# Patient Record
Sex: FEMALE | Race: OTHER | Employment: FULL TIME | URBAN - NONMETROPOLITAN AREA
[De-identification: names, ages, dates, MRNs, and addresses within clinical notes are randomized per-mention and may not be internally consistent; named-entity substitution may affect disease eponyms.]

---

## 2017-07-03 ENCOUNTER — TELEPHONE (OUTPATIENT)
Dept: FAMILY MEDICINE CLINIC | Age: 22
End: 2017-07-03

## 2017-07-10 ENCOUNTER — OFFICE VISIT (OUTPATIENT)
Dept: FAMILY MEDICINE CLINIC | Age: 22
End: 2017-07-10

## 2017-07-10 ENCOUNTER — TELEPHONE (OUTPATIENT)
Dept: FAMILY MEDICINE CLINIC | Age: 22
End: 2017-07-10

## 2017-07-10 VITALS
HEIGHT: 62 IN | SYSTOLIC BLOOD PRESSURE: 118 MMHG | WEIGHT: 164.4 LBS | DIASTOLIC BLOOD PRESSURE: 74 MMHG | BODY MASS INDEX: 30.25 KG/M2 | TEMPERATURE: 98.2 F | RESPIRATION RATE: 12 BRPM | HEART RATE: 80 BPM

## 2017-07-10 DIAGNOSIS — R07.9 CHEST PAIN, UNSPECIFIED TYPE: Primary | ICD-10-CM

## 2017-07-10 DIAGNOSIS — N92.6 IRREGULAR PERIODS: ICD-10-CM

## 2017-07-10 DIAGNOSIS — F41.9 ANXIETY: ICD-10-CM

## 2017-07-10 LAB
CONTROL: NORMAL
PREGNANCY TEST URINE, POC: NORMAL

## 2017-07-10 PROCEDURE — 99203 OFFICE O/P NEW LOW 30 MIN: CPT | Performed by: NURSE PRACTITIONER

## 2017-07-10 PROCEDURE — 81025 URINE PREGNANCY TEST: CPT | Performed by: NURSE PRACTITIONER

## 2017-07-10 PROCEDURE — 93000 ELECTROCARDIOGRAM COMPLETE: CPT | Performed by: NURSE PRACTITIONER

## 2017-07-10 RX ORDER — CLONAZEPAM 0.5 MG/1
0.5 TABLET ORAL 2 TIMES DAILY PRN
COMMUNITY
End: 2018-03-26 | Stop reason: ALTCHOICE

## 2017-07-10 RX ORDER — ESCITALOPRAM OXALATE 10 MG/1
10 TABLET ORAL DAILY
Qty: 30 TABLET | Refills: 3 | Status: SHIPPED | OUTPATIENT
Start: 2017-07-10 | End: 2018-03-13 | Stop reason: SINTOL

## 2017-07-14 ENCOUNTER — TELEPHONE (OUTPATIENT)
Dept: FAMILY MEDICINE CLINIC | Age: 22
End: 2017-07-14

## 2017-07-17 PROCEDURE — 87389 HIV-1 AG W/HIV-1&-2 AB AG IA: CPT

## 2017-07-17 PROCEDURE — 36415 COLL VENOUS BLD VENIPUNCTURE: CPT

## 2017-07-17 PROCEDURE — 86706 HEP B SURFACE ANTIBODY: CPT

## 2017-07-17 PROCEDURE — 86803 HEPATITIS C AB TEST: CPT

## 2017-08-16 ENCOUNTER — TELEPHONE (OUTPATIENT)
Dept: FAMILY MEDICINE CLINIC | Age: 22
End: 2017-08-16

## 2017-08-28 ENCOUNTER — TELEPHONE (OUTPATIENT)
Dept: FAMILY MEDICINE CLINIC | Age: 22
End: 2017-08-28

## 2017-09-15 ENCOUNTER — TELEPHONE (OUTPATIENT)
Dept: FAMILY MEDICINE CLINIC | Age: 22
End: 2017-09-15

## 2018-03-13 ENCOUNTER — TELEPHONE (OUTPATIENT)
Dept: FAMILY MEDICINE CLINIC | Age: 23
End: 2018-03-13

## 2018-03-13 ENCOUNTER — HOSPITAL ENCOUNTER (EMERGENCY)
Age: 23
Discharge: HOME | End: 2018-03-13
Payer: COMMERCIAL

## 2018-03-13 VITALS
HEIGHT: 62 IN | SYSTOLIC BLOOD PRESSURE: 121 MMHG | RESPIRATION RATE: 16 BRPM | HEART RATE: 91 BPM | BODY MASS INDEX: 31.1 KG/M2 | TEMPERATURE: 99.6 F | OXYGEN SATURATION: 98 % | DIASTOLIC BLOOD PRESSURE: 72 MMHG | WEIGHT: 169 LBS

## 2018-03-13 DIAGNOSIS — F41.1 ANXIETY STATE: ICD-10-CM

## 2018-03-13 DIAGNOSIS — R07.89 RIGHT-SIDED CHEST WALL PAIN: ICD-10-CM

## 2018-03-13 DIAGNOSIS — N30.01 ACUTE CYSTITIS WITH HEMATURIA: Primary | ICD-10-CM

## 2018-03-13 LAB
BILIRUBIN URINE: NEGATIVE
BLOOD, URINE: ABNORMAL
CHARACTER, URINE: CLEAR
COLOR: ABNORMAL
EKG ATRIAL RATE: 83 BPM
EKG P AXIS: 48 DEGREES
EKG P-R INTERVAL: 148 MS
EKG Q-T INTERVAL: 358 MS
EKG QRS DURATION: 84 MS
EKG QTC CALCULATION (BAZETT): 420 MS
EKG R AXIS: 61 DEGREES
EKG T AXIS: 73 DEGREES
EKG VENTRICULAR RATE: 83 BPM
GLUCOSE BLD-MCNC: 85 MG/DL (ref 70–108)
GLUCOSE, URINE: 100 MG/DL
KETONES, URINE: NEGATIVE
LEUKOCYTES, UA: ABNORMAL
NITRATE, UA: POSITIVE
PH UA: 7.5 (ref 5–9)
PROTEIN UA: 30 MG/DL
REFLEX TO URINE C & S: ABNORMAL
SPECIFIC GRAVITY UA: 1.01 (ref 1–1.03)
UROBILINOGEN, URINE: 1 EU/DL (ref 0–1)

## 2018-03-13 PROCEDURE — 81003 URINALYSIS AUTO W/O SCOPE: CPT

## 2018-03-13 PROCEDURE — 99215 OFFICE O/P EST HI 40 MIN: CPT

## 2018-03-13 PROCEDURE — 87086 URINE CULTURE/COLONY COUNT: CPT

## 2018-03-13 PROCEDURE — 99213 OFFICE O/P EST LOW 20 MIN: CPT | Performed by: NURSE PRACTITIONER

## 2018-03-13 PROCEDURE — 82948 REAGENT STRIP/BLOOD GLUCOSE: CPT

## 2018-03-13 PROCEDURE — 93005 ELECTROCARDIOGRAM TRACING: CPT | Performed by: EMERGENCY MEDICINE

## 2018-03-13 RX ORDER — METHYLPREDNISOLONE 4 MG/1
TABLET ORAL
Qty: 1 KIT | Refills: 0 | Status: SHIPPED | OUTPATIENT
Start: 2018-03-13 | End: 2018-03-19

## 2018-03-13 RX ORDER — LORATADINE 10 MG/1
10 TABLET ORAL DAILY
COMMUNITY
End: 2018-12-30

## 2018-03-13 RX ORDER — M-VIT,TX,IRON,MINS/CALC/FOLIC 27MG-0.4MG
1 TABLET ORAL DAILY
COMMUNITY

## 2018-03-13 RX ORDER — NITROFURANTOIN 25; 75 MG/1; MG/1
100 CAPSULE ORAL 2 TIMES DAILY
Qty: 10 CAPSULE | Refills: 0 | Status: SHIPPED | OUTPATIENT
Start: 2018-03-13 | End: 2018-03-18

## 2018-03-13 RX ORDER — HYDROXYZINE HYDROCHLORIDE 25 MG/1
25 TABLET, FILM COATED ORAL 3 TIMES DAILY PRN
Qty: 15 TABLET | Refills: 0 | Status: SHIPPED | OUTPATIENT
Start: 2018-03-13 | End: 2018-03-18

## 2018-03-13 RX ORDER — NICOTINE 14MG/24HR
1 PATCH, TRANSDERMAL 24 HOURS TRANSDERMAL DAILY
Qty: 14 CAPSULE | Refills: 0 | Status: SHIPPED | OUTPATIENT
Start: 2018-03-13 | End: 2018-06-13

## 2018-03-13 ASSESSMENT — ENCOUNTER SYMPTOMS
CHOKING: 0
SORE THROAT: 0
ABDOMINAL DISTENTION: 0
HEMATOCHEZIA: 0
VISUAL CHANGE: 0
CONSTIPATION: 0
NAUSEA: 1
BACK PAIN: 0
RECTAL PAIN: 0
ANAL BLEEDING: 0
COUGH: 0
BLOOD IN STOOL: 0
STRIDOR: 0
DIARRHEA: 0
SINUS PAIN: 0
SINUS PRESSURE: 0
WHEEZING: 0
VOMITING: 0
SHORTNESS OF BREATH: 0
ABDOMINAL PAIN: 1
APNEA: 0
CHEST TIGHTNESS: 0

## 2018-03-13 ASSESSMENT — PAIN DESCRIPTION - DESCRIPTORS: DESCRIPTORS_2: SHARP

## 2018-03-13 ASSESSMENT — PAIN DESCRIPTION - LOCATION: LOCATION_2: CHEST

## 2018-03-13 ASSESSMENT — PAIN DESCRIPTION - ORIENTATION: ORIENTATION_2: LEFT;MID

## 2018-03-13 ASSESSMENT — PAIN SCALES - GENERAL: PAINLEVEL_OUTOF10: 9

## 2018-03-13 ASSESSMENT — PAIN DESCRIPTION - INTENSITY: RATING_2: 3

## 2018-03-13 NOTE — ED PROVIDER NOTES
back or flank pain or increase in hematuria the patient is advised to go to the emergency department for reevaluation and further follow-up if they wouldn't notice any of the above symptoms. The patient or Patient designated representative was also advised to follow up with family doctor or primary care provider after the antibiotic is completed for repeat urinalysis. The patient did verbalize understanding of discharge instructions and is agreeable to the treatment plan. The patient left ambulatory without any changes or concerns in stable condition.       PATIENT REFERRED TO:  Jessica Lacey, [address] [place] [address] [address] [facility]  [phone]    Schedule an appointment as soon as possible for a visit in 1 week  For wound re-check    Kinjal Adams, PhD  [address] [place] [address] [phone]    Call   As needed    DISCHARGE MEDICATIONS:  Discharge Medication List as of 3/13/2018  5:31 PM      START taking these medications    Details   nitrofurantoin, macrocrystal-monohydrate, (MACROBID) 100 MG capsule Take 1 capsule by mouth 2 times daily for 5 days, Disp-10 capsule, R-0Normal      Saccharomyces boulardii (PROBIOTIC) 250 MG CAPS Take 1 capsule by mouth daily, Disp-14 capsule, R-0Normal      methylPREDNISolone (MEDROL, MANDY,) 4 MG tablet Take by mouth., Disp-1 kit, R-0Normal      Cyanocobalamin 1000 MCG LOZG Take 1 lozenge by mouth daily, Disp-30 lozenge, R-0Normal      hydrOXYzine (ATARAX) 25 MG tablet Take 1 tablet by mouth 3 times daily as needed for Anxiety, Disp-15 tablet, R-0Normal           Discharge Medication List as of 3/13/2018  5:31 PM          Zunilda Davis, LEONARDA Davis NP  03/13/18 1739

## 2018-03-14 LAB
ORGANISM: ABNORMAL
URINE CULTURE REFLEX: ABNORMAL

## 2018-03-26 ENCOUNTER — HOSPITAL ENCOUNTER (EMERGENCY)
Age: 23
Discharge: HOME | End: 2018-03-26
Payer: COMMERCIAL

## 2018-03-26 ENCOUNTER — TELEPHONE (OUTPATIENT)
Dept: FAMILY MEDICINE CLINIC | Age: 23
End: 2018-03-26

## 2018-03-26 VITALS
OXYGEN SATURATION: 98 % | TEMPERATURE: 98 F | HEART RATE: 85 BPM | SYSTOLIC BLOOD PRESSURE: 110 MMHG | DIASTOLIC BLOOD PRESSURE: 73 MMHG | RESPIRATION RATE: 16 BRPM

## 2018-03-26 DIAGNOSIS — R53.83 FATIGUE, UNSPECIFIED TYPE: ICD-10-CM

## 2018-03-26 DIAGNOSIS — N39.0 RECURRENT UTI: Primary | ICD-10-CM

## 2018-03-26 LAB
BILIRUBIN URINE: NEGATIVE
BLOOD, URINE: ABNORMAL
CHARACTER, URINE: CLEAR
COLOR: YELLOW
GLUCOSE, URINE: NEGATIVE MG/DL
KETONES, URINE: NEGATIVE
LEUKOCYTES, UA: ABNORMAL
MONONUCLEOSIS ANTIBODY: NEGATIVE
NITRATE, UA: NEGATIVE
PH UA: 6 (ref 5–9)
PROTEIN UA: ABNORMAL MG/DL
REFLEX TO URINE C & S: ABNORMAL
SPECIFIC GRAVITY UA: >= 1.03 (ref 1–1.03)
UROBILINOGEN, URINE: 0.2 EU/DL (ref 0–1)

## 2018-03-26 PROCEDURE — 81003 URINALYSIS AUTO W/O SCOPE: CPT

## 2018-03-26 PROCEDURE — 99214 OFFICE O/P EST MOD 30 MIN: CPT

## 2018-03-26 PROCEDURE — 36415 COLL VENOUS BLD VENIPUNCTURE: CPT

## 2018-03-26 PROCEDURE — 99214 OFFICE O/P EST MOD 30 MIN: CPT | Performed by: NURSE PRACTITIONER

## 2018-03-26 PROCEDURE — 87086 URINE CULTURE/COLONY COUNT: CPT

## 2018-03-26 PROCEDURE — 86308 HETEROPHILE ANTIBODY SCREEN: CPT

## 2018-03-26 RX ORDER — PHENAZOPYRIDINE HYDROCHLORIDE 100 MG/1
100 TABLET, FILM COATED ORAL 3 TIMES DAILY PRN
Qty: 9 TABLET | Refills: 0 | Status: SHIPPED | OUTPATIENT
Start: 2018-03-26 | End: 2018-03-29

## 2018-03-26 RX ORDER — LEVOFLOXACIN 500 MG/1
500 TABLET, FILM COATED ORAL DAILY
Qty: 7 TABLET | Refills: 0 | Status: SHIPPED | OUTPATIENT
Start: 2018-03-26 | End: 2018-04-02

## 2018-03-26 ASSESSMENT — PAIN DESCRIPTION - FREQUENCY: FREQUENCY: INTERMITTENT

## 2018-03-26 ASSESSMENT — PAIN DESCRIPTION - PAIN TYPE: TYPE: ACUTE PAIN

## 2018-03-26 ASSESSMENT — ENCOUNTER SYMPTOMS
ABDOMINAL DISTENTION: 0
NAUSEA: 0
SORE THROAT: 1
BACK PAIN: 1
COLOR CHANGE: 0
COUGH: 1
BLOOD IN STOOL: 0
CHEST TIGHTNESS: 0
RHINORRHEA: 0
WHEEZING: 0
VOMITING: 0
SHORTNESS OF BREATH: 0
DIARRHEA: 0
TROUBLE SWALLOWING: 0
ABDOMINAL PAIN: 1

## 2018-03-26 ASSESSMENT — PAIN DESCRIPTION - LOCATION: LOCATION: ABDOMEN

## 2018-03-26 ASSESSMENT — PAIN DESCRIPTION - ORIENTATION: ORIENTATION: LOWER

## 2018-03-26 ASSESSMENT — PAIN SCALES - GENERAL: PAINLEVEL_OUTOF10: 8

## 2018-03-26 ASSESSMENT — PAIN DESCRIPTION - DESCRIPTORS: DESCRIPTORS: BURNING;PRESSURE

## 2018-03-26 NOTE — ED TRIAGE NOTES
Patient walked to room 5 for lower abdominal pressure, pain with urination and low back pain onset 3 months ago, symptoms never went away after antibiotics multiple times but keep coming back. No other needs.

## 2018-03-26 NOTE — ED PROVIDER NOTES
external ear and ear canal normal.   Left Ear: Hearing, tympanic membrane, external ear and ear canal normal.   Nose: Nose normal. No mucosal edema or rhinorrhea. Right sinus exhibits no maxillary sinus tenderness and no frontal sinus tenderness. Left sinus exhibits no maxillary sinus tenderness and no frontal sinus tenderness. Mouth/Throat: Uvula is midline, oropharynx is clear and moist and mucous membranes are normal. No trismus in the jaw. No uvula swelling. No oropharyngeal exudate, posterior oropharyngeal edema, posterior oropharyngeal erythema or tonsillar abscesses. Eyes: Right conjunctiva is not injected. Right conjunctiva has no hemorrhage. Left conjunctiva is not injected. Left conjunctiva has no hemorrhage. No scleral icterus. Neck: Trachea normal and normal range of motion. Neck supple. No thyromegaly present. Cardiovascular: Normal rate, regular rhythm and normal heart sounds. No extrasystoles are present. PMI is not displaced. Exam reveals no gallop and no friction rub. No murmur heard. Pulmonary/Chest: Effort normal and breath sounds normal. No respiratory distress. Abdominal: Soft. Normal appearance and bowel sounds are normal. She exhibits no distension and no mass. There is no hepatosplenomegaly. There is tenderness in the suprapubic area. There is no rigidity, no rebound, no guarding, no CVA tenderness, no tenderness at McBurney's point and negative Meredith's sign. No hernia. Lymphadenopathy:        Head (right side): No submental, no submandibular, no tonsillar, no preauricular, no posterior auricular and no occipital adenopathy present. Head (left side): No submental, no submandibular, no tonsillar, no preauricular, no posterior auricular and no occipital adenopathy present. She has no cervical adenopathy. Right: No supraclavicular adenopathy present. Left: No supraclavicular adenopathy present.    Neurological: She is alert and oriented to person,

## 2018-03-27 LAB
ORGANISM: ABNORMAL
URINE CULTURE REFLEX: ABNORMAL

## 2018-05-22 ENCOUNTER — HOSPITAL ENCOUNTER (EMERGENCY)
Age: 23
Discharge: HOME | End: 2018-05-22
Payer: COMMERCIAL

## 2018-05-22 VITALS
TEMPERATURE: 98.7 F | RESPIRATION RATE: 14 BRPM | BODY MASS INDEX: 28.78 KG/M2 | HEART RATE: 100 BPM | WEIGHT: 156.38 LBS | DIASTOLIC BLOOD PRESSURE: 76 MMHG | HEIGHT: 62 IN | SYSTOLIC BLOOD PRESSURE: 134 MMHG | OXYGEN SATURATION: 99 %

## 2018-05-22 DIAGNOSIS — N39.0 RECURRENT UTI: ICD-10-CM

## 2018-05-22 DIAGNOSIS — N39.0 URINARY TRACT INFECTION WITH HEMATURIA, SITE UNSPECIFIED: Primary | ICD-10-CM

## 2018-05-22 DIAGNOSIS — R31.9 URINARY TRACT INFECTION WITH HEMATURIA, SITE UNSPECIFIED: Primary | ICD-10-CM

## 2018-05-22 LAB
BILIRUBIN URINE: NEGATIVE
BLOOD, URINE: ABNORMAL
CHARACTER, URINE: ABNORMAL
COLOR: ABNORMAL
GLUCOSE, URINE: NEGATIVE MG/DL
KETONES, URINE: NEGATIVE
LEUKOCYTES, UA: ABNORMAL
NITRATE, UA: NEGATIVE
PH UA: 8.5 (ref 5–9)
PROTEIN UA: 30 MG/DL
REFLEX TO URINE C & S: ABNORMAL
SPECIFIC GRAVITY UA: 1.01 (ref 1–1.03)
UROBILINOGEN, URINE: 0.2 EU/DL (ref 0–1)

## 2018-05-22 PROCEDURE — 87077 CULTURE AEROBIC IDENTIFY: CPT

## 2018-05-22 PROCEDURE — 87086 URINE CULTURE/COLONY COUNT: CPT

## 2018-05-22 PROCEDURE — 81003 URINALYSIS AUTO W/O SCOPE: CPT

## 2018-05-22 PROCEDURE — 87184 SC STD DISK METHOD PER PLATE: CPT

## 2018-05-22 PROCEDURE — 99214 OFFICE O/P EST MOD 30 MIN: CPT

## 2018-05-22 PROCEDURE — 99213 OFFICE O/P EST LOW 20 MIN: CPT | Performed by: NURSE PRACTITIONER

## 2018-05-22 PROCEDURE — 87186 SC STD MICRODIL/AGAR DIL: CPT

## 2018-05-22 RX ORDER — CIPROFLOXACIN 250 MG/1
250 TABLET, FILM COATED ORAL 2 TIMES DAILY
Qty: 6 TABLET | Refills: 0 | Status: SHIPPED | OUTPATIENT
Start: 2018-05-22 | End: 2018-05-25

## 2018-05-22 RX ORDER — GRANULES FOR ORAL 3 G/1
3 POWDER ORAL ONCE
Qty: 1 EACH | Refills: 0 | Status: SHIPPED | OUTPATIENT
Start: 2018-05-22 | End: 2018-05-22

## 2018-05-22 ASSESSMENT — ENCOUNTER SYMPTOMS
NAUSEA: 0
VOMITING: 0
CHEST TIGHTNESS: 0
ABDOMINAL PAIN: 0
DIARRHEA: 0
SHORTNESS OF BREATH: 0

## 2018-05-24 LAB
ORGANISM: ABNORMAL
URINE CULTURE REFLEX: ABNORMAL

## 2018-06-13 ENCOUNTER — TELEPHONE (OUTPATIENT)
Dept: UROLOGY | Age: 23
End: 2018-06-13

## 2018-06-13 ENCOUNTER — OFFICE VISIT (OUTPATIENT)
Dept: UROLOGY | Age: 23
End: 2018-06-13
Payer: COMMERCIAL

## 2018-06-13 VITALS
HEIGHT: 62 IN | WEIGHT: 156 LBS | DIASTOLIC BLOOD PRESSURE: 68 MMHG | BODY MASS INDEX: 28.71 KG/M2 | SYSTOLIC BLOOD PRESSURE: 118 MMHG

## 2018-06-13 DIAGNOSIS — Z87.440 HISTORY OF RECURRENT UTIS: Primary | ICD-10-CM

## 2018-06-13 LAB
BACTERIA: ABNORMAL
BILIRUBIN URINE: NEGATIVE
BILIRUBIN URINE: NEGATIVE
BLOOD URINE, POC: NORMAL
BLOOD, URINE: NEGATIVE
CASTS: ABNORMAL /LPF
CASTS: ABNORMAL /LPF
CHARACTER, URINE: ABNORMAL
CHARACTER, URINE: CLEAR
COLOR, URINE: YELLOW
COLOR: YELLOW
CRYSTALS: ABNORMAL
EPITHELIAL CELLS, UA: ABNORMAL /HPF
GLUCOSE URINE: NEGATIVE MG/DL
GLUCOSE, URINE: NEGATIVE MG/DL
KETONES, URINE: NEGATIVE
KETONES, URINE: NEGATIVE
LEUKOCYTE CLUMPS, URINE: NEGATIVE
LEUKOCYTE ESTERASE, URINE: NEGATIVE
MISCELLANEOUS LAB TEST RESULT: ABNORMAL
MUCUS: ABNORMAL
NITRITE, URINE: NEGATIVE
NITRITE, URINE: NEGATIVE
PH UA: 6
PH, URINE: 6
POST VOID RESIDUAL (PVR): 41 ML
PROTEIN UA: NEGATIVE MG/DL
PROTEIN, URINE: NEGATIVE MG/DL
RBC URINE: ABNORMAL /HPF
RENAL EPITHELIAL, UA: ABNORMAL
SPECIFIC GRAVITY UA: 1.03 (ref 1–1.03)
SPECIFIC GRAVITY, URINE: >= 1.03 (ref 1–1.03)
UROBILINOGEN, URINE: 0.2 EU/DL
UROBILINOGEN, URINE: 0.2 EU/DL
WBC UA: ABNORMAL /HPF
YEAST: ABNORMAL

## 2018-06-13 PROCEDURE — 99203 OFFICE O/P NEW LOW 30 MIN: CPT | Performed by: NURSE PRACTITIONER

## 2018-06-13 PROCEDURE — 81003 URINALYSIS AUTO W/O SCOPE: CPT | Performed by: NURSE PRACTITIONER

## 2018-06-13 PROCEDURE — 51798 US URINE CAPACITY MEASURE: CPT | Performed by: NURSE PRACTITIONER

## 2018-06-13 RX ORDER — NITROFURANTOIN 25; 75 MG/1; MG/1
CAPSULE ORAL
Qty: 30 CAPSULE | Refills: 1 | Status: SHIPPED | OUTPATIENT
Start: 2018-06-13 | End: 2018-12-30

## 2018-06-13 ASSESSMENT — ENCOUNTER SYMPTOMS
NAUSEA: 0
ABDOMINAL PAIN: 0
ALLERGIC/IMMUNOLOGIC NEGATIVE: 1
RESPIRATORY NEGATIVE: 1
EYES NEGATIVE: 1
VOMITING: 0
BACK PAIN: 0
ABDOMINAL DISTENTION: 0

## 2018-07-03 ENCOUNTER — HOSPITAL ENCOUNTER (EMERGENCY)
Age: 23
Discharge: HOME | End: 2018-07-03
Attending: EMERGENCY MEDICINE

## 2018-07-03 VITALS
SYSTOLIC BLOOD PRESSURE: 123 MMHG | TEMPERATURE: 98.3 F | RESPIRATION RATE: 18 BRPM | HEART RATE: 74 BPM | OXYGEN SATURATION: 100 % | DIASTOLIC BLOOD PRESSURE: 73 MMHG

## 2018-07-03 DIAGNOSIS — N39.0 URINARY TRACT INFECTION WITHOUT HEMATURIA, SITE UNSPECIFIED: Primary | ICD-10-CM

## 2018-07-03 LAB
AMPHETAMINE+METHAMPHETAMINE URINE SCREEN: NEGATIVE
BACTERIA: ABNORMAL /HPF
BARBITURATE QUANTITATIVE URINE: NEGATIVE
BENZODIAZEPINE QUANTITATIVE URINE: NEGATIVE
BILIRUBIN URINE: NEGATIVE
BLOOD, URINE: NEGATIVE
CANNABINOID QUANTITATIVE URINE: NEGATIVE
CASTS 2: ABNORMAL /LPF
CASTS UA: ABNORMAL /LPF
CHARACTER, URINE: CLEAR
COCAINE METABOLITE QUANTITATIVE URINE: NEGATIVE
COLOR: ABNORMAL
CRYSTALS, UA: ABNORMAL
EPITHELIAL CELLS, UA: ABNORMAL /HPF
GLUCOSE URINE: NEGATIVE MG/DL
KETONES, URINE: ABNORMAL
LEUKOCYTE ESTERASE, URINE: ABNORMAL
MISCELLANEOUS 2: ABNORMAL
NITRITE, URINE: ABNORMAL
OPIATES, URINE: NEGATIVE
OXYCODONE: NEGATIVE
PH UA: 5.5
PHENCYCLIDINE QUANTITATIVE URINE: NEGATIVE
PROTEIN UA: NEGATIVE
RBC URINE: ABNORMAL /HPF
RENAL EPITHELIAL, UA: ABNORMAL
SPECIFIC GRAVITY, URINE: 1.02 (ref 1–1.03)
UROBILINOGEN, URINE: 1 EU/DL
WBC UA: ABNORMAL /HPF
YEAST: ABNORMAL

## 2018-07-03 PROCEDURE — 87086 URINE CULTURE/COLONY COUNT: CPT

## 2018-07-03 PROCEDURE — 81001 URINALYSIS AUTO W/SCOPE: CPT

## 2018-07-03 PROCEDURE — 87186 SC STD MICRODIL/AGAR DIL: CPT

## 2018-07-03 PROCEDURE — 87184 SC STD DISK METHOD PER PLATE: CPT

## 2018-07-03 PROCEDURE — 99283 EMERGENCY DEPT VISIT LOW MDM: CPT

## 2018-07-03 PROCEDURE — 87077 CULTURE AEROBIC IDENTIFY: CPT

## 2018-07-03 PROCEDURE — 80307 DRUG TEST PRSMV CHEM ANLYZR: CPT

## 2018-07-03 RX ORDER — PHENAZOPYRIDINE HYDROCHLORIDE 100 MG/1
100 TABLET, FILM COATED ORAL 3 TIMES DAILY PRN
Qty: 9 TABLET | Refills: 0 | Status: SHIPPED | OUTPATIENT
Start: 2018-07-03 | End: 2018-07-06

## 2018-07-03 RX ORDER — SULFAMETHOXAZOLE AND TRIMETHOPRIM 800; 160 MG/1; MG/1
1 TABLET ORAL 2 TIMES DAILY
Qty: 20 TABLET | Refills: 0 | Status: SHIPPED | OUTPATIENT
Start: 2018-07-03 | End: 2018-07-13

## 2018-07-03 ASSESSMENT — ENCOUNTER SYMPTOMS
SINUS PRESSURE: 0
NAUSEA: 0
VOMITING: 0
SORE THROAT: 0
PHOTOPHOBIA: 0
EYE DISCHARGE: 0
CHEST TIGHTNESS: 0
DIARRHEA: 0
EYE PAIN: 0
WHEEZING: 0
SHORTNESS OF BREATH: 0
TROUBLE SWALLOWING: 0
ABDOMINAL DISTENTION: 0
VOICE CHANGE: 0
COUGH: 0
RHINORRHEA: 0
ABDOMINAL PAIN: 0
CHOKING: 0
EYE ITCHING: 0
CONSTIPATION: 0
BACK PAIN: 0
BLOOD IN STOOL: 0
EYE REDNESS: 0

## 2018-07-03 ASSESSMENT — PAIN DESCRIPTION - LOCATION: LOCATION: ABDOMEN

## 2018-07-03 ASSESSMENT — PAIN SCALES - GENERAL: PAINLEVEL_OUTOF10: 4

## 2018-07-03 NOTE — ED PROVIDER NOTES
[facility]  eMERGENCY dEPARTMENT eNCOUnter          CHIEF COMPLAINT       Chief Complaint   Patient presents with    Dysuria       Nurses Notes reviewed and I agree except as noted in the HPI. HISTORY OF PRESENT ILLNESS    Corry Vitale is a 26 y.o. female with an extensive history of UTIs who presents for dysuria. The patient states that her pain is consistent with her previous experiences with UTIs. The patient describes her pain as a burning sensation with urination which has been ongoing for the past several days. She rates her pain as a 4/10 in severity. The patient denies any other signs or symptoms at this time. The patient admits taking 1 dose of pyridium at home. She follows with urology for her recurrant UTIs. REVIEW OF SYSTEMS     Review of Systems   Constitutional: Negative for activity change, appetite change, diaphoresis, fatigue and unexpected weight change. HENT: Negative for congestion, ear discharge, ear pain, hearing loss, rhinorrhea, sinus pressure, sore throat, trouble swallowing and voice change. Eyes: Negative for photophobia, pain, discharge, redness and itching. Respiratory: Negative for cough, choking, chest tightness, shortness of breath and wheezing. Cardiovascular: Negative for chest pain, palpitations and leg swelling. Gastrointestinal: Negative for abdominal distention, abdominal pain, blood in stool, constipation, diarrhea, nausea and vomiting. Endocrine: Negative for polydipsia, polyphagia and polyuria. Genitourinary: Positive for dysuria. Negative for decreased urine volume, difficulty urinating, enuresis, frequency, hematuria and urgency. Musculoskeletal: Negative for arthralgias, back pain, gait problem, myalgias, neck pain and neck stiffness. Skin: Negative for pallor and rash. Allergic/Immunologic: Negative for immunocompromised state.    Neurological: Negative for dizziness, tremors, seizures, syncope, facial asymmetry, ear normal.   Left Ear: External ear normal.   Nose: Nose normal.   Mouth/Throat: Oropharynx is clear and moist. No oropharyngeal exudate. Eyes: Conjunctivae and EOM are normal. Pupils are equal, round, and reactive to light. Right eye exhibits no discharge. Left eye exhibits no discharge. No scleral icterus. Neck: Normal range of motion. Neck supple. No JVD present. No tracheal deviation present. Cardiovascular: Normal rate, regular rhythm, normal heart sounds and intact distal pulses. Exam reveals no gallop and no friction rub. No murmur heard. Pulmonary/Chest: Effort normal and breath sounds normal. She has no wheezes. She has no rales. Abdominal: Soft. Bowel sounds are normal. She exhibits no mass. There is no tenderness. There is no rebound and no guarding. Musculoskeletal: Normal range of motion. She exhibits no edema or tenderness. Lymphadenopathy:     She has no cervical adenopathy. Neurological: She is alert and oriented to person, place, and time. She has normal reflexes. No cranial nerve deficit. She exhibits normal muscle tone. Coordination normal.   Skin: Skin is warm and dry. No rash noted. She is not diaphoretic. Psychiatric: She has a normal mood and affect. Her behavior is normal. Judgment and thought content normal.   Nursing note and vitals reviewed.       DIFFERENTIAL DIAGNOSIS:   Differential diagnoses were discussed extensively with the patient and family including but no limited to UTI, candida, STD,      DIAGNOSTIC RESULTS     EKG: All EKG's are interpreted by the Emergency Department Physician who either signs or Co-signs this chart in the absence of a cardiologist.  None    RADIOLOGY: non-plain film images(s) such as CT, Ultrasound and MRI are read by the radiologist.    No orders to display           LABS:   Labs Reviewed   URINE WITH REFLEXED MICRO - Abnormal; Notable for the following:        Result Value    Ketones, Urine TRACE (*)     Leukocyte Esterase, Urine TRACE phenazopyridine (PYRIDIUM) 100 MG tablet Take 1 tablet by mouth 3 times daily as needed for Pain, Disp-9 tablet, R-0Print             (Please note that portions of this note were completed with a voice recognition program.  Efforts were made to edit the dictations but occasionally words are mis-transcribed.)    Scribe:  Jennifer Snow 7/3/18 3:29 AM Scribing for and in the presence of Anna Christopher DO. Signed by: Cherie Fong, 07/03/18 6:20 AM    Provider:  I personally performed the services described in the documentation, reviewed and edited the documentation which was dictated to the scribe in my presence, and it accurately records my words and actions.     Anna Christopher DO 7/3/18 6:20 AM        DO Anna Soto DO  07/03/18 [id number]

## 2018-07-05 LAB
ORGANISM: ABNORMAL
URINE CULTURE REFLEX: ABNORMAL

## 2018-07-06 NOTE — PROGRESS NOTES
Pharmacy Note  ED Culture Follow-up    Jeannie Tracey is a 26 y.o. female. Allergies: Patient has no known allergies. Labs:  Lab Results   Component Value Date    BUN 10 09/05/2014    CREATININE 0.7 09/05/2014    WBC 10.0 09/05/2014     CrCl cannot be calculated (Patient's most recent lab result is older than the maximum 10 days allowed. ). Current antimicrobials:   · bactrim    ASSESSMENT:  Micro results:   Urine culture: positive for e. coli      PLAN:  Need for intervention: Yes  Discussed with: Dr. Griffin Ray MD   Chosen treatment:    · Patient already on appropriate treatment as above    Patient response:   · No need to contact patient    Called/sent in prescription to: Not applicable    Please call with any questions.  Slava Andrews, PharmD 5:07 PM 7/6/2018

## 2018-12-30 ENCOUNTER — HOSPITAL ENCOUNTER (EMERGENCY)
Age: 23
Discharge: HOME | End: 2018-12-30

## 2018-12-30 ENCOUNTER — APPOINTMENT (OUTPATIENT)
Dept: GENERAL RADIOLOGY | Age: 23
End: 2018-12-30

## 2018-12-30 VITALS
BODY MASS INDEX: 26.52 KG/M2 | SYSTOLIC BLOOD PRESSURE: 92 MMHG | DIASTOLIC BLOOD PRESSURE: 63 MMHG | HEART RATE: 86 BPM | TEMPERATURE: 98.4 F | OXYGEN SATURATION: 98 % | RESPIRATION RATE: 18 BRPM | WEIGHT: 145 LBS

## 2018-12-30 DIAGNOSIS — M94.0 COSTOCHONDRITIS: ICD-10-CM

## 2018-12-30 DIAGNOSIS — J40 BRONCHITIS: Primary | ICD-10-CM

## 2018-12-30 DIAGNOSIS — R09.89 CHEST CONGESTION: ICD-10-CM

## 2018-12-30 DIAGNOSIS — R09.81 NASAL CONGESTION: ICD-10-CM

## 2018-12-30 DIAGNOSIS — K52.9 GASTROENTERITIS: ICD-10-CM

## 2018-12-30 DIAGNOSIS — J02.9 ACUTE PHARYNGITIS, UNSPECIFIED ETIOLOGY: ICD-10-CM

## 2018-12-30 LAB
ALBUMIN SERPL-MCNC: 4.7 G/DL (ref 3.5–5.1)
ALP BLD-CCNC: 65 U/L (ref 38–126)
ALT SERPL-CCNC: 37 U/L (ref 11–66)
ANION GAP SERPL CALCULATED.3IONS-SCNC: 11 MEQ/L (ref 8–16)
AST SERPL-CCNC: 17 U/L (ref 5–40)
BASOPHILS # BLD: 0.3 %
BASOPHILS ABSOLUTE: 0 THOU/MM3 (ref 0–0.1)
BILIRUB SERPL-MCNC: 0.7 MG/DL (ref 0.3–1.2)
BILIRUBIN DIRECT: < 0.2 MG/DL (ref 0–0.3)
BUN BLDV-MCNC: 9 MG/DL (ref 7–22)
CALCIUM SERPL-MCNC: 9.7 MG/DL (ref 8.5–10.5)
CHLORIDE BLD-SCNC: 103 MEQ/L (ref 98–111)
CO2: 26 MEQ/L (ref 23–33)
CREAT SERPL-MCNC: 0.6 MG/DL (ref 0.4–1.2)
EOSINOPHIL # BLD: 1.7 %
EOSINOPHILS ABSOLUTE: 0.2 THOU/MM3 (ref 0–0.4)
ERYTHROCYTE [DISTWIDTH] IN BLOOD BY AUTOMATED COUNT: 13.1 % (ref 11.5–14.5)
ERYTHROCYTE [DISTWIDTH] IN BLOOD BY AUTOMATED COUNT: 41.3 FL (ref 35–45)
FLU A ANTIGEN: NEGATIVE
FLU B ANTIGEN: NEGATIVE
GFR SERPL CREATININE-BSD FRML MDRD: > 90 ML/MIN/1.73M2
GLUCOSE BLD-MCNC: 115 MG/DL (ref 70–108)
GROUP A STREP CULTURE, REFLEX: NEGATIVE
HCT VFR BLD CALC: 38.4 % (ref 37–47)
HEMOGLOBIN: 12.8 GM/DL (ref 12–16)
IMMATURE GRANS (ABS): 0.02 THOU/MM3 (ref 0–0.07)
IMMATURE GRANULOCYTES: 0.2 %
LYMPHOCYTES # BLD: 26.4 %
LYMPHOCYTES ABSOLUTE: 2.8 THOU/MM3 (ref 1–4.8)
MCH RBC QN AUTO: 29.1 PG (ref 26–33)
MCHC RBC AUTO-ENTMCNC: 33.3 GM/DL (ref 32.2–35.5)
MCV RBC AUTO: 87.3 FL (ref 81–99)
MONOCYTES # BLD: 7.1 %
MONOCYTES ABSOLUTE: 0.7 THOU/MM3 (ref 0.4–1.3)
NUCLEATED RED BLOOD CELLS: 0 /100 WBC
OSMOLALITY CALCULATION: 279 MOSMOL/KG (ref 275–300)
PLATELET # BLD: 311 THOU/MM3 (ref 130–400)
PMV BLD AUTO: 10.2 FL (ref 9.4–12.4)
POTASSIUM SERPL-SCNC: 3.1 MEQ/L (ref 3.5–5.2)
PREGNANCY, SERUM: NEGATIVE
RBC # BLD: 4.4 MILL/MM3 (ref 4.2–5.4)
REFLEX THROAT C + S: NORMAL
SEG NEUTROPHILS: 64.3 %
SEGMENTED NEUTROPHILS ABSOLUTE COUNT: 6.8 THOU/MM3 (ref 1.8–7.7)
SODIUM BLD-SCNC: 140 MEQ/L (ref 135–145)
TOTAL PROTEIN: 7.7 G/DL (ref 6.1–8)
WBC # BLD: 10.5 THOU/MM3 (ref 4.8–10.8)

## 2018-12-30 PROCEDURE — 71046 X-RAY EXAM CHEST 2 VIEWS: CPT

## 2018-12-30 PROCEDURE — 2709999900 HC NON-CHARGEABLE SUPPLY

## 2018-12-30 PROCEDURE — 94640 AIRWAY INHALATION TREATMENT: CPT

## 2018-12-30 PROCEDURE — 6370000000 HC RX 637 (ALT 250 FOR IP): Performed by: PHYSICIAN ASSISTANT

## 2018-12-30 PROCEDURE — 80053 COMPREHEN METABOLIC PANEL: CPT

## 2018-12-30 PROCEDURE — 87070 CULTURE OTHR SPECIMN AEROBIC: CPT

## 2018-12-30 PROCEDURE — 96374 THER/PROPH/DIAG INJ IV PUSH: CPT

## 2018-12-30 PROCEDURE — 85025 COMPLETE CBC W/AUTO DIFF WBC: CPT

## 2018-12-30 PROCEDURE — 36415 COLL VENOUS BLD VENIPUNCTURE: CPT

## 2018-12-30 PROCEDURE — 99284 EMERGENCY DEPT VISIT MOD MDM: CPT

## 2018-12-30 PROCEDURE — 87804 INFLUENZA ASSAY W/OPTIC: CPT

## 2018-12-30 PROCEDURE — 84703 CHORIONIC GONADOTROPIN ASSAY: CPT

## 2018-12-30 PROCEDURE — 6360000002 HC RX W HCPCS: Performed by: PHYSICIAN ASSISTANT

## 2018-12-30 PROCEDURE — 87880 STREP A ASSAY W/OPTIC: CPT

## 2018-12-30 PROCEDURE — 96375 TX/PRO/DX INJ NEW DRUG ADDON: CPT

## 2018-12-30 PROCEDURE — 2580000003 HC RX 258: Performed by: PHYSICIAN ASSISTANT

## 2018-12-30 PROCEDURE — 82248 BILIRUBIN DIRECT: CPT

## 2018-12-30 RX ORDER — BENZONATATE 100 MG/1
100 CAPSULE ORAL 3 TIMES DAILY PRN
Qty: 30 CAPSULE | Refills: 0 | Status: SHIPPED | OUTPATIENT
Start: 2018-12-30 | End: 2019-01-06

## 2018-12-30 RX ORDER — ONDANSETRON 4 MG/1
4 TABLET, ORALLY DISINTEGRATING ORAL EVERY 8 HOURS PRN
Qty: 20 TABLET | Refills: 0 | Status: SHIPPED | OUTPATIENT
Start: 2018-12-30 | End: 2019-03-15

## 2018-12-30 RX ORDER — 0.9 % SODIUM CHLORIDE 0.9 %
1000 INTRAVENOUS SOLUTION INTRAVENOUS ONCE
Status: COMPLETED | OUTPATIENT
Start: 2018-12-30 | End: 2018-12-30

## 2018-12-30 RX ORDER — PREDNISONE 20 MG/1
20 TABLET ORAL 2 TIMES DAILY
Qty: 10 TABLET | Refills: 0 | Status: SHIPPED | OUTPATIENT
Start: 2018-12-30 | End: 2019-01-04

## 2018-12-30 RX ORDER — KETOROLAC TROMETHAMINE 30 MG/ML
30 INJECTION, SOLUTION INTRAMUSCULAR; INTRAVENOUS ONCE
Status: COMPLETED | OUTPATIENT
Start: 2018-12-30 | End: 2018-12-30

## 2018-12-30 RX ORDER — IPRATROPIUM BROMIDE AND ALBUTEROL SULFATE 2.5; .5 MG/3ML; MG/3ML
1 SOLUTION RESPIRATORY (INHALATION) ONCE
Status: COMPLETED | OUTPATIENT
Start: 2018-12-30 | End: 2018-12-30

## 2018-12-30 RX ORDER — POTASSIUM CHLORIDE 20 MEQ/1
40 TABLET, EXTENDED RELEASE ORAL ONCE
Status: COMPLETED | OUTPATIENT
Start: 2018-12-30 | End: 2018-12-30

## 2018-12-30 RX ORDER — METHYLPREDNISOLONE SODIUM SUCCINATE 125 MG/2ML
125 INJECTION, POWDER, LYOPHILIZED, FOR SOLUTION INTRAMUSCULAR; INTRAVENOUS ONCE
Status: COMPLETED | OUTPATIENT
Start: 2018-12-30 | End: 2018-12-30

## 2018-12-30 RX ORDER — ACETAMINOPHEN 500 MG
500 TABLET ORAL EVERY 6 HOURS PRN
Qty: 120 TABLET | Refills: 0 | Status: SHIPPED | OUTPATIENT
Start: 2018-12-30

## 2018-12-30 RX ADMIN — IPRATROPIUM BROMIDE AND ALBUTEROL SULFATE 1 AMPULE: .5; 3 SOLUTION RESPIRATORY (INHALATION) at 20:41

## 2018-12-30 RX ADMIN — KETOROLAC TROMETHAMINE 30 MG: 30 INJECTION, SOLUTION INTRAMUSCULAR at 20:15

## 2018-12-30 RX ADMIN — METHYLPREDNISOLONE SODIUM SUCCINATE 125 MG: 125 INJECTION, POWDER, FOR SOLUTION INTRAMUSCULAR; INTRAVENOUS at 20:15

## 2018-12-30 RX ADMIN — SODIUM CHLORIDE 1000 ML: 9 INJECTION, SOLUTION INTRAVENOUS at 20:16

## 2018-12-30 RX ADMIN — POTASSIUM CHLORIDE 40 MEQ: 20 TABLET, EXTENDED RELEASE ORAL at 20:37

## 2018-12-30 ASSESSMENT — ENCOUNTER SYMPTOMS
DIARRHEA: 1
COUGH: 1
EYE DISCHARGE: 0
WHEEZING: 0
CONSTIPATION: 0
SORE THROAT: 0
SHORTNESS OF BREATH: 0
NAUSEA: 1
RHINORRHEA: 1
VOMITING: 1
ABDOMINAL PAIN: 0
COLOR CHANGE: 0
BACK PAIN: 0
EYE REDNESS: 0

## 2018-12-30 ASSESSMENT — PAIN DESCRIPTION - PAIN TYPE
TYPE: ACUTE PAIN
TYPE: ACUTE PAIN

## 2018-12-30 ASSESSMENT — PAIN DESCRIPTION - FREQUENCY
FREQUENCY: CONTINUOUS
FREQUENCY: CONTINUOUS

## 2018-12-30 ASSESSMENT — PAIN DESCRIPTION - DESCRIPTORS
DESCRIPTORS: ACHING
DESCRIPTORS: ACHING

## 2018-12-30 ASSESSMENT — PAIN DESCRIPTION - LOCATION: LOCATION: THROAT;HEAD

## 2018-12-30 ASSESSMENT — PAIN DESCRIPTION - ONSET
ONSET: ON-GOING
ONSET: ON-GOING

## 2018-12-30 ASSESSMENT — PAIN SCALES - GENERAL
PAINLEVEL_OUTOF10: 4
PAINLEVEL_OUTOF10: 4
PAINLEVEL_OUTOF10: 3

## 2018-12-30 ASSESSMENT — PAIN DESCRIPTION - PROGRESSION
CLINICAL_PROGRESSION: NOT CHANGED
CLINICAL_PROGRESSION: GRADUALLY IMPROVING

## 2019-01-01 LAB — THROAT/NOSE CULTURE: NORMAL

## 2019-01-03 ASSESSMENT — ENCOUNTER SYMPTOMS
SINUS PRESSURE: 0
SINUS PAIN: 0

## 2019-03-15 ENCOUNTER — HOSPITAL ENCOUNTER (EMERGENCY)
Age: 24
Discharge: HOME | End: 2019-03-15
Payer: COMMERCIAL

## 2019-03-15 VITALS
RESPIRATION RATE: 16 BRPM | OXYGEN SATURATION: 97 % | TEMPERATURE: 98.9 F | HEART RATE: 98 BPM | DIASTOLIC BLOOD PRESSURE: 67 MMHG | SYSTOLIC BLOOD PRESSURE: 121 MMHG | WEIGHT: 142 LBS | BODY MASS INDEX: 26.13 KG/M2 | HEIGHT: 62 IN

## 2019-03-15 DIAGNOSIS — N30.90 CYSTITIS: Primary | ICD-10-CM

## 2019-03-15 DIAGNOSIS — R30.0 DYSURIA: ICD-10-CM

## 2019-03-15 LAB
BILIRUBIN URINE: NEGATIVE
BLOOD, URINE: NEGATIVE
CHARACTER, URINE: ABNORMAL
COLOR: YELLOW
GLUCOSE, URINE: NEGATIVE MG/DL
KETONES, URINE: NEGATIVE
LEUKOCYTES, UA: ABNORMAL
NITRATE, UA: NEGATIVE
PH UA: 7 (ref 5–9)
PREGNANCY, URINE: NEGATIVE
PROTEIN UA: NEGATIVE MG/DL
REFLEX TO URINE C & S: ABNORMAL
SPECIFIC GRAVITY UA: 1.01 (ref 1–1.03)
UROBILINOGEN, URINE: 0.2 EU/DL (ref 0–1)

## 2019-03-15 PROCEDURE — 99213 OFFICE O/P EST LOW 20 MIN: CPT

## 2019-03-15 PROCEDURE — 84703 CHORIONIC GONADOTROPIN ASSAY: CPT

## 2019-03-15 PROCEDURE — 99213 OFFICE O/P EST LOW 20 MIN: CPT | Performed by: NURSE PRACTITIONER

## 2019-03-15 PROCEDURE — 87086 URINE CULTURE/COLONY COUNT: CPT

## 2019-03-15 PROCEDURE — 81003 URINALYSIS AUTO W/O SCOPE: CPT

## 2019-03-15 RX ORDER — CIPROFLOXACIN 500 MG/1
500 TABLET, FILM COATED ORAL 2 TIMES DAILY
Qty: 6 TABLET | Refills: 0 | Status: SHIPPED | OUTPATIENT
Start: 2019-03-15 | End: 2019-03-18

## 2019-03-15 RX ORDER — PHENAZOPYRIDINE HYDROCHLORIDE 100 MG/1
100 TABLET, FILM COATED ORAL 3 TIMES DAILY PRN
Qty: 9 TABLET | Refills: 0 | Status: SHIPPED | OUTPATIENT
Start: 2019-03-15 | End: 2019-03-18

## 2019-03-15 ASSESSMENT — PAIN SCALES - GENERAL: PAINLEVEL_OUTOF10: 4

## 2019-03-15 ASSESSMENT — PAIN DESCRIPTION - LOCATION: LOCATION: ABDOMEN

## 2019-03-15 ASSESSMENT — PAIN DESCRIPTION - ORIENTATION: ORIENTATION: LOWER

## 2019-03-15 ASSESSMENT — PAIN DESCRIPTION - DESCRIPTORS: DESCRIPTORS: PRESSURE

## 2019-03-16 ASSESSMENT — ENCOUNTER SYMPTOMS
COUGH: 0
VOMITING: 0
ABDOMINAL PAIN: 1
CHEST TIGHTNESS: 0
SHORTNESS OF BREATH: 0
STRIDOR: 0
WHEEZING: 0
NAUSEA: 0

## 2019-03-17 LAB
ORGANISM: ABNORMAL
URINE CULTURE, ROUTINE: ABNORMAL

## 2019-05-08 ENCOUNTER — HOSPITAL ENCOUNTER (EMERGENCY)
Age: 24
Discharge: HOME | End: 2019-05-09
Attending: FAMILY MEDICINE
Payer: COMMERCIAL

## 2019-05-08 ENCOUNTER — APPOINTMENT (OUTPATIENT)
Dept: CT IMAGING | Age: 24
End: 2019-05-08
Payer: COMMERCIAL

## 2019-05-08 DIAGNOSIS — R10.31 ABDOMINAL PAIN, RIGHT LOWER QUADRANT: Primary | ICD-10-CM

## 2019-05-08 LAB
ALBUMIN SERPL-MCNC: 4.3 G/DL (ref 3.5–5.1)
ALP BLD-CCNC: 59 U/L (ref 38–126)
ALT SERPL-CCNC: 71 U/L (ref 11–66)
ANION GAP SERPL CALCULATED.3IONS-SCNC: 15 MEQ/L (ref 8–16)
AST SERPL-CCNC: 36 U/L (ref 5–40)
BASOPHILS # BLD: 0.6 %
BASOPHILS ABSOLUTE: 0.1 THOU/MM3 (ref 0–0.1)
BILIRUB SERPL-MCNC: < 0.2 MG/DL (ref 0.3–1.2)
BILIRUBIN URINE: NEGATIVE
BLOOD, URINE: NEGATIVE
BUN BLDV-MCNC: 14 MG/DL (ref 7–22)
C-REACTIVE PROTEIN: 0.11 MG/DL (ref 0–1)
CALCIUM SERPL-MCNC: 9.4 MG/DL (ref 8.5–10.5)
CHARACTER, URINE: CLEAR
CHLORIDE BLD-SCNC: 102 MEQ/L (ref 98–111)
CO2: 23 MEQ/L (ref 23–33)
COLOR: YELLOW
CREAT SERPL-MCNC: 0.8 MG/DL (ref 0.4–1.2)
EOSINOPHIL # BLD: 2.4 %
EOSINOPHILS ABSOLUTE: 0.2 THOU/MM3 (ref 0–0.4)
ERYTHROCYTE [DISTWIDTH] IN BLOOD BY AUTOMATED COUNT: 13.6 % (ref 11.5–14.5)
ERYTHROCYTE [DISTWIDTH] IN BLOOD BY AUTOMATED COUNT: 43.2 FL (ref 35–45)
GFR SERPL CREATININE-BSD FRML MDRD: 89 ML/MIN/1.73M2
GLUCOSE BLD-MCNC: 90 MG/DL (ref 70–108)
GLUCOSE URINE: NEGATIVE MG/DL
HCT VFR BLD CALC: 37.5 % (ref 37–47)
HEMOGLOBIN: 12.3 GM/DL (ref 12–16)
IMMATURE GRANS (ABS): 0.02 THOU/MM3 (ref 0–0.07)
IMMATURE GRANULOCYTES: 0.2 %
KETONES, URINE: NEGATIVE
LEUKOCYTE ESTERASE, URINE: NEGATIVE
LIPASE: 40.8 U/L (ref 5.6–51.3)
LYMPHOCYTES # BLD: 35.9 %
LYMPHOCYTES ABSOLUTE: 3.1 THOU/MM3 (ref 1–4.8)
MCH RBC QN AUTO: 28.7 PG (ref 26–33)
MCHC RBC AUTO-ENTMCNC: 32.8 GM/DL (ref 32.2–35.5)
MCV RBC AUTO: 87.4 FL (ref 81–99)
MONOCYTES # BLD: 9.5 %
MONOCYTES ABSOLUTE: 0.8 THOU/MM3 (ref 0.4–1.3)
NITRITE, URINE: NEGATIVE
NUCLEATED RED BLOOD CELLS: 0 /100 WBC
OSMOLALITY CALCULATION: 279.4 MOSMOL/KG (ref 275–300)
PH UA: 7 (ref 5–9)
PLATELET # BLD: 280 THOU/MM3 (ref 130–400)
PMV BLD AUTO: 10.6 FL (ref 9.4–12.4)
POTASSIUM REFLEX MAGNESIUM: 4 MEQ/L (ref 3.5–5.2)
PREGNANCY, SERUM: NEGATIVE
PROTEIN UA: NEGATIVE
RBC # BLD: 4.29 MILL/MM3 (ref 4.2–5.4)
SEG NEUTROPHILS: 51.4 %
SEGMENTED NEUTROPHILS ABSOLUTE COUNT: 4.4 THOU/MM3 (ref 1.8–7.7)
SODIUM BLD-SCNC: 140 MEQ/L (ref 135–145)
SPECIFIC GRAVITY, URINE: 1.01 (ref 1–1.03)
TOTAL PROTEIN: 7.2 G/DL (ref 6.1–8)
UROBILINOGEN, URINE: 0.2 EU/DL (ref 0–1)
WBC # BLD: 8.6 THOU/MM3 (ref 4.8–10.8)

## 2019-05-08 PROCEDURE — 80053 COMPREHEN METABOLIC PANEL: CPT

## 2019-05-08 PROCEDURE — 86140 C-REACTIVE PROTEIN: CPT

## 2019-05-08 PROCEDURE — 36415 COLL VENOUS BLD VENIPUNCTURE: CPT

## 2019-05-08 PROCEDURE — 96376 TX/PRO/DX INJ SAME DRUG ADON: CPT

## 2019-05-08 PROCEDURE — 81003 URINALYSIS AUTO W/O SCOPE: CPT

## 2019-05-08 PROCEDURE — 84703 CHORIONIC GONADOTROPIN ASSAY: CPT

## 2019-05-08 PROCEDURE — 99284 EMERGENCY DEPT VISIT MOD MDM: CPT

## 2019-05-08 PROCEDURE — 96375 TX/PRO/DX INJ NEW DRUG ADDON: CPT

## 2019-05-08 PROCEDURE — 6360000002 HC RX W HCPCS: Performed by: FAMILY MEDICINE

## 2019-05-08 PROCEDURE — 2580000003 HC RX 258: Performed by: FAMILY MEDICINE

## 2019-05-08 PROCEDURE — 85025 COMPLETE CBC W/AUTO DIFF WBC: CPT

## 2019-05-08 PROCEDURE — 83690 ASSAY OF LIPASE: CPT

## 2019-05-08 PROCEDURE — 74177 CT ABD & PELVIS W/CONTRAST: CPT

## 2019-05-08 PROCEDURE — 6360000004 HC RX CONTRAST MEDICATION: Performed by: FAMILY MEDICINE

## 2019-05-08 PROCEDURE — 96374 THER/PROPH/DIAG INJ IV PUSH: CPT

## 2019-05-08 RX ORDER — ONDANSETRON 2 MG/ML
4 INJECTION INTRAMUSCULAR; INTRAVENOUS ONCE
Status: COMPLETED | OUTPATIENT
Start: 2019-05-09 | End: 2019-05-08

## 2019-05-08 RX ORDER — FENTANYL CITRATE 50 UG/ML
100 INJECTION, SOLUTION INTRAMUSCULAR; INTRAVENOUS
Status: COMPLETED | OUTPATIENT
Start: 2019-05-08 | End: 2019-05-09

## 2019-05-08 RX ORDER — ONDANSETRON 2 MG/ML
4 INJECTION INTRAMUSCULAR; INTRAVENOUS EVERY 30 MIN PRN
Status: COMPLETED | OUTPATIENT
Start: 2019-05-08 | End: 2019-05-09

## 2019-05-08 RX ORDER — 0.9 % SODIUM CHLORIDE 0.9 %
1000 INTRAVENOUS SOLUTION INTRAVENOUS ONCE
Status: COMPLETED | OUTPATIENT
Start: 2019-05-08 | End: 2019-05-09

## 2019-05-08 RX ORDER — 0.9 % SODIUM CHLORIDE 0.9 %
1000 INTRAVENOUS SOLUTION INTRAVENOUS ONCE
Status: COMPLETED | OUTPATIENT
Start: 2019-05-08 | End: 2019-05-08

## 2019-05-08 RX ORDER — KETOROLAC TROMETHAMINE 30 MG/ML
30 INJECTION, SOLUTION INTRAMUSCULAR; INTRAVENOUS ONCE
Status: COMPLETED | OUTPATIENT
Start: 2019-05-09 | End: 2019-05-08

## 2019-05-08 RX ADMIN — ONDANSETRON 4 MG: 2 INJECTION INTRAMUSCULAR; INTRAVENOUS at 20:58

## 2019-05-08 RX ADMIN — IOPAMIDOL 80 ML: 755 INJECTION, SOLUTION INTRAVENOUS at 22:34

## 2019-05-08 RX ADMIN — KETOROLAC TROMETHAMINE 30 MG: 30 INJECTION, SOLUTION INTRAMUSCULAR at 23:43

## 2019-05-08 RX ADMIN — FENTANYL CITRATE 100 MCG: 50 INJECTION, SOLUTION INTRAMUSCULAR; INTRAVENOUS at 20:57

## 2019-05-08 RX ADMIN — IOHEXOL 50 ML: 240 INJECTION, SOLUTION INTRATHECAL; INTRAVASCULAR; INTRAVENOUS; ORAL at 22:42

## 2019-05-08 RX ADMIN — SODIUM CHLORIDE 1000 ML: 9 INJECTION, SOLUTION INTRAVENOUS at 20:57

## 2019-05-08 RX ADMIN — ONDANSETRON 4 MG: 2 INJECTION INTRAMUSCULAR; INTRAVENOUS at 23:43

## 2019-05-08 RX ADMIN — SODIUM CHLORIDE 1000 ML: 9 INJECTION, SOLUTION INTRAVENOUS at 22:43

## 2019-05-08 ASSESSMENT — ENCOUNTER SYMPTOMS
EYE PAIN: 0
SORE THROAT: 0
WHEEZING: 0
COUGH: 0
DIARRHEA: 0
RHINORRHEA: 0
BACK PAIN: 0
SHORTNESS OF BREATH: 0
EYE DISCHARGE: 0
ABDOMINAL PAIN: 1
NAUSEA: 1
VOMITING: 1

## 2019-05-08 ASSESSMENT — PAIN SCALES - GENERAL
PAINLEVEL_OUTOF10: 10
PAINLEVEL_OUTOF10: 10
PAINLEVEL_OUTOF10: 6
PAINLEVEL_OUTOF10: 6

## 2019-05-08 ASSESSMENT — PAIN DESCRIPTION - PAIN TYPE: TYPE: ACUTE PAIN

## 2019-05-08 NOTE — LETTER
[address] [facility] [address] [address] EMERGENCY DEPT  [address] [facility]  [place] [address]  Phone: [phone]               May 9, 2019    Patient: Kendra Boston   YOB: 1995   Date of Visit: 5/8/2019       To Whom It May Concern:    Gurjit Melo was seen and treated in our emergency department on 5/8/2019. She may return to work on 5/11/2019.       Sincerely,       Emergency Department Provider         Signature:__________________________________

## 2019-05-09 ENCOUNTER — APPOINTMENT (OUTPATIENT)
Dept: ULTRASOUND IMAGING | Age: 24
End: 2019-05-09
Payer: COMMERCIAL

## 2019-05-09 VITALS
OXYGEN SATURATION: 98 % | SYSTOLIC BLOOD PRESSURE: 94 MMHG | HEART RATE: 68 BPM | HEIGHT: 62 IN | DIASTOLIC BLOOD PRESSURE: 57 MMHG | RESPIRATION RATE: 17 BRPM | TEMPERATURE: 99.2 F | WEIGHT: 135 LBS | BODY MASS INDEX: 24.84 KG/M2

## 2019-05-09 PROCEDURE — 76830 TRANSVAGINAL US NON-OB: CPT

## 2019-05-09 PROCEDURE — 96376 TX/PRO/DX INJ SAME DRUG ADON: CPT

## 2019-05-09 PROCEDURE — 93975 VASCULAR STUDY: CPT

## 2019-05-09 PROCEDURE — 6360000002 HC RX W HCPCS: Performed by: FAMILY MEDICINE

## 2019-05-09 RX ORDER — ONDANSETRON 4 MG/1
4 TABLET, ORALLY DISINTEGRATING ORAL EVERY 8 HOURS PRN
Qty: 20 TABLET | Refills: 0 | Status: SHIPPED | OUTPATIENT
Start: 2019-05-09 | End: 2019-06-14

## 2019-05-09 RX ORDER — NAPROXEN 500 MG/1
500 TABLET ORAL 2 TIMES DAILY
Qty: 20 TABLET | Refills: 0 | Status: SHIPPED | OUTPATIENT
Start: 2019-05-09 | End: 2019-06-14

## 2019-05-09 RX ADMIN — ONDANSETRON 4 MG: 2 INJECTION INTRAMUSCULAR; INTRAVENOUS at 01:07

## 2019-05-09 RX ADMIN — FENTANYL CITRATE 100 MCG: 50 INJECTION, SOLUTION INTRAMUSCULAR; INTRAVENOUS at 01:07

## 2019-05-09 ASSESSMENT — PAIN SCALES - GENERAL: PAINLEVEL_OUTOF10: 7

## 2019-05-09 NOTE — ED PROVIDER NOTES
[facility]  eMERGENCY dEPARTMENT eNCOUnter          CHIEF COMPLAINT       Chief Complaint   Patient presents with    Abdominal Pain       Nurses Notes reviewed and I agree except as noted inthe HPI. HISTORY OF PRESENT ILLNESS    Pooja Hernandez is a 22 y.o. female who presents to the Emergency Department for the evaluation of RLQ abdominal pain. The patient has a history of UTI's and ovarian cysts. Patient is sexually active. She has the paragard IUD in place. Patient developed a sudden onset of this RLQ  abdominal pain at 1930. She rates her current pain at moderate  in severity. This is a new pain for her. She reports associated nausea but no vomiting or diarrhea. She had one episode of emesis this morning, but none since this pain began. She denies diarrhea or constipation. She denies fever or chills. She denies pain/burning with urination, hematuria, frequency, or urgency. The patient has occasional streaks of bright, red blood with bowel movements which she associates with chronic hemorrhoids. Patient appears uncomfortable, but is not in any acute distress at this time. She has no  Other complaints at this time. There are no other complaints or symptoms. The HPI was provided by the patient. REVIEW OF SYSTEMS     Review of Systems   Constitutional: Negative for appetite change, chills, fatigue and fever. HENT: Negative for congestion, ear pain, rhinorrhea and sore throat. Eyes: Negative for pain, discharge and visual disturbance. Respiratory: Negative for cough, shortness of breath and wheezing. Cardiovascular: Negative for chest pain, palpitations and leg swelling. Gastrointestinal: Positive for abdominal pain, nausea and vomiting. Negative for diarrhea. Genitourinary: Negative for difficulty urinating, dysuria, hematuria and vaginal discharge.    Musculoskeletal: Negative for arthralgias, back pain, joint swelling and neck pain.   Skin: Negative for pallor and rash. Neurological: Negative for dizziness, syncope, weakness, light-headedness, numbness and headaches. Hematological: Negative for adenopathy. Psychiatric/Behavioral: Negative for confusion and suicidal ideas. The patient is not nervous/anxious. PAST MEDICAL HISTORY    has a past medical history of Anxiety, Asthma, Ovarian cyst, Tachycardia, and UTI (urinary tract infection). SURGICAL HISTORY      has a past surgical history that includes  section (2012 and 2016); [place] tooth extraction (); and toenail excision (Bilateral, ). CURRENT MEDICATIONS       Previous Medications    ACETAMINOPHEN (APAP EXTRA STRENGTH) 500 MG TABLET    Take 1 tablet by mouth every 6 hours as needed for Pain    IBUPROFEN (ADVIL;MOTRIN) 100 MG TABLET    Take 600 mg by mouth every 6 hours as needed for Fever    IUD'S IU    by Intrauterine route    MULTIPLE VITAMINS-MINERALS (THERAPEUTIC MULTIVITAMIN-MINERALS) TABLET    Take 1 tablet by mouth daily       ALLERGIES     has No Known Allergies. FAMILY HISTORY     indicated that her mother is alive. She indicated that her father is alive. family history includes Other in her mother. SOCIAL HISTORY      reports that she quit smoking about 3 years ago. She has never used smokeless tobacco. She reports that she drinks alcohol. She reports that she does not use drugs. PHYSICAL EXAM     INITIAL VITALS:  height is 5' 2\" (1.575 m) and weight is 135 lb (61.2 kg). Her oral temperature is 99.2 °F (37.3 °C). Her blood pressure is 98/73 and her pulse is 67. Her respiration is 16 and oxygen saturation is 96%. Physical Exam   Constitutional: She is oriented to person, place, and time. She appears well-developed and well-nourished. HENT:   Head: Normocephalic and atraumatic. Right Ear: External ear normal.   Left Ear: External ear normal.   Eyes: Conjunctivae are normal. Right eye exhibits no discharge.  Left eye exhibits no discharge. No scleral icterus. Neck: Normal range of motion. Neck supple. No JVD present. Cardiovascular: Normal rate and regular rhythm. Pulmonary/Chest: Effort normal. No stridor. No respiratory distress. Abdominal: Soft. She exhibits no distension. There is tenderness in the right lower quadrant. Musculoskeletal: Normal range of motion. She exhibits no edema. Neurological: She is alert and oriented to person, place, and time. She exhibits normal muscle tone. GCS eye subscore is 4. GCS verbal subscore is 5. GCS motor subscore is 6. Skin: Skin is warm and dry. She is not diaphoretic. No erythema. Psychiatric: She has a normal mood and affect. Her behavior is normal.   Nursing note and vitals reviewed. DIFFERENTIAL DIAGNOSIS:   Appendicitis, Ovarian cyst, Ovarian torsion, UTI, Renal colic    DIAGNOSTIC RESULTS     EKG: All EKG's are interpreted by the Emergency Department Physician who either signs or Co-signs this chart in the absence of acardiologist.    RADIOLOGY: non-plain film images(s) such as CT, Ultrasound and MRI are read by the radiologist.    CT ABDOMEN PELVIS W IV CONTRAST Additional Contrast? Oral   Final Result   1. Mild retained stool. 2. Visualized IUD. 3. Follicular changes of the right ovary. .   **This report has been created using voice recognition software. It may contain minor errors which are inherent in voice recognition technology. **      Final report electronically signed by Dr. Vishnu Gomez on 5/8/2019 11:04 PM      US NON OB TRANSVAGINAL    (Results Pending)   US DUP ABD PEL RETRO SCROT COMPLETE    (Results Pending)       LABS:   Labs Reviewed   COMPREHENSIVE METABOLIC PANEL W/ REFLEX TO MG FOR LOW K - Abnormal; Notable for the following components:       Result Value    Total Bilirubin <0.2 (*)     ALT 71 (*)     All other components within normal limits   GLOMERULAR FILTRATION RATE, ESTIMATED - Abnormal; Notable for the following components: Est, Glom Filt Rate 89 (*)     All other components within normal limits   CBC WITH AUTO DIFFERENTIAL   LIPASE   C-REACTIVE PROTEIN   URINE RT REFLEX TO CULTURE   ANION GAP   OSMOLALITY   HCG, SERUM, QUALITATIVE       EMERGENCY DEPARTMENT COURSE:   Vitals:    Vitals:    05/08/19 2133 05/08/19 2258 05/08/19 2334 05/09/19 0018   BP: 104/72  (!) 94/49 98/73   Pulse:  67     Resp:  16     Temp:       TempSrc:       SpO2: 99% 98% 96%    Weight:       Height:         MDM    Patient  presented to the emergency department with acute right lower quadrant abdominal pain for one hour with nausea with  tenderness on the right lower quadrant. No guarding or rigidity. Urinalysis is negative CBC is normal chemistries are normal CT abdomen contrast is normal showing mild retained stool with visualized IUD and for the changes on the right ovary. No acute findings at this time. Appendix is normal. US pending at this time. FINAL IMPRESSION      1. Abdominal pain, right lower quadrant          DISPOSITION/PLAN     Patient is discharged. PATIENT REFERRED TO:  No follow-up provider specified. DISCHARGE MEDICATIONS:  New Prescriptions    No medications on file       (Please note that portions of this note were completed with a voice recognition program.Efforts were made to edit the dictations but occasionally words are mis-transcribed.)    Scribe:  Signed by: Erskin Canavan, Scribe, 5/8/198:36 PM Scribing for and in the presence of Jersey Thrasher MD    Provider:  I personally performed the services described in the documentation, reviewed and edited the documentation which was dictated to the scribe in mypresence, and it accurately records my words and actions.     Jersey Thrasher MD 5/8/19 1:17 AM                      Jersey Thrasher MD  05/09/19 6424

## 2019-05-09 NOTE — ED NOTES
Pt remains off the floor at ultrasound. Will continue to monitor for the patient's return.      Emily Clemons RN  05/09/19 0104

## 2019-05-09 NOTE — ED NOTES
Patient resting on cart in no distress, state pain medication helped.      Marisel Go RN  05/08/19 0166

## 2019-05-09 NOTE — ED NOTES
Pt is returned to room from ultrasound. The patient complains of increased pain. PT is medicated as ordered, Pt is updated on POC and pending results. Will continue to monitor the patient.      Maryellen Corley RN  05/09/19 1536
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
## 2019-06-14 ENCOUNTER — HOSPITAL ENCOUNTER (EMERGENCY)
Age: 24
Discharge: HOME | End: 2019-06-14
Payer: COMMERCIAL

## 2019-06-14 VITALS
RESPIRATION RATE: 16 BRPM | HEIGHT: 62 IN | HEART RATE: 78 BPM | DIASTOLIC BLOOD PRESSURE: 80 MMHG | SYSTOLIC BLOOD PRESSURE: 131 MMHG | OXYGEN SATURATION: 98 % | TEMPERATURE: 98.3 F | BODY MASS INDEX: 25.76 KG/M2 | WEIGHT: 140 LBS

## 2019-06-14 DIAGNOSIS — B00.1 COLD SORE: Primary | ICD-10-CM

## 2019-06-14 PROCEDURE — 99213 OFFICE O/P EST LOW 20 MIN: CPT | Performed by: NURSE PRACTITIONER

## 2019-06-14 PROCEDURE — 99212 OFFICE O/P EST SF 10 MIN: CPT

## 2019-06-14 RX ORDER — PREDNISONE 20 MG/1
20 TABLET ORAL 2 TIMES DAILY
Qty: 10 TABLET | Refills: 0 | Status: SHIPPED | OUTPATIENT
Start: 2019-06-14 | End: 2019-06-19

## 2019-06-14 RX ORDER — ACYCLOVIR 200 MG/1
200 CAPSULE ORAL
Qty: 25 CAPSULE | Refills: 0 | Status: SHIPPED | OUTPATIENT
Start: 2019-06-14 | End: 2019-06-19

## 2019-06-14 ASSESSMENT — ENCOUNTER SYMPTOMS
COUGH: 0
SORE THROAT: 0
SHORTNESS OF BREATH: 0
TROUBLE SWALLOWING: 0

## 2019-06-14 NOTE — ED PROVIDER NOTES
every 6 hours as needed for Fever    IUD'S IU    by Intrauterine route    MULTIPLE VITAMINS-MINERALS (THERAPEUTIC MULTIVITAMIN-MINERALS) TABLET    Take 1 tablet by mouth daily       ALLERGIES     Patient is has No Known Allergies. Patients   There is no immunization history on file for this patient. FAMILY HISTORY     Patient's family history includes Other in her mother. SOCIAL HISTORY     Patient  reports that she quit smoking about 3 years ago. She has never used smokeless tobacco. She reports that she drinks alcohol. She reports that she does not use drugs. PHYSICAL EXAM     ED TRIAGE VITALS  BP: 131/80, Temp: 98.3 °F (36.8 °C), Pulse: 78, Resp: 16, SpO2: 98 %,Estimated body mass index is 25.61 kg/m² as calculated from the following:    Height as of this encounter: 5' 2\" (1.575 m). Weight as of this encounter: 140 lb (63.5 kg). ,No LMP recorded. Physical Exam   Constitutional: She is oriented to person, place, and time. Vital signs are normal. She appears well-developed and well-nourished. She is active and cooperative. Non-toxic appearance. She does not have a sickly appearance. She does not appear ill. No distress. HENT:   Head: Normocephalic and atraumatic. Mouth/Throat: Uvula is midline, oropharynx is clear and moist and mucous membranes are normal. Oral lesions present. Cardiovascular: Normal rate. Pulmonary/Chest: Effort normal.   Neurological: She is alert and oriented to person, place, and time. Skin: Skin is warm and dry. No rash noted. Nursing note and vitals reviewed. DIAGNOSTIC RESULTS     Labs:No results found for this visit on 06/14/19.     IMAGING:    No orders to display         EKG:      URGENT CARE COURSE:     Vitals:    06/14/19 1355   BP: 131/80   Pulse: 78   Resp: 16   Temp: 98.3 °F (36.8 °C)   TempSrc: Temporal   SpO2: 98%   Weight: 140 lb (63.5 kg)   Height: 5' 2\" (1.575 m)       Medications - No data to display         PROCEDURES:  None    FINAL IMPRESSION      1. Cold sore          DISPOSITION/ PLAN     The patient's physical exam symptoms consistent with cold sore. She will be treated with acyclovir and prednisone. The patient is to take the medication as prescribed/directed. The patient is to follow-up with their PCP in 2-3 days. They are to go to the ER for any worsening symptoms. The patient/caregiver were agreeable to this plan of care and voiced no concerns at time of discharge. The patient was ambulatory out of the department in stable condition. PATIENT REFERRED TO:  No primary care provider on file. No primary physician on file.       DISCHARGE MEDICATIONS:  New Prescriptions    ACYCLOVIR (ZOVIRAX) 200 MG CAPSULE    Take 1 capsule by mouth 5 times daily for 5 days    PREDNISONE (DELTASONE) 20 MG TABLET    Take 1 tablet by mouth 2 times daily for 5 days       Discontinued Medications    NAPROXEN (NAPROSYN) 500 MG TABLET    Take 1 tablet by mouth 2 times daily    ONDANSETRON (ZOFRAN ODT) 4 MG DISINTEGRATING TABLET    Take 1 tablet by mouth every 8 hours as needed for Nausea       Current Discharge Medication List          MARYCRUZ Maldonado CNP    (Please note that portions of this note were completed with a voice recognition program. Efforts were made to edit the dictations but occasionally words are mis-transcribed.)            MARYCRUZ Maldonado CNP  06/14/19 0253

## 2019-07-03 ENCOUNTER — TELEPHONE (OUTPATIENT)
Dept: FAMILY MEDICINE CLINIC | Age: 24
End: 2019-07-03

## 2019-07-03 ENCOUNTER — OFFICE VISIT (OUTPATIENT)
Dept: FAMILY MEDICINE CLINIC | Age: 24
End: 2019-07-03
Payer: COMMERCIAL

## 2019-07-03 VITALS
TEMPERATURE: 98.5 F | DIASTOLIC BLOOD PRESSURE: 62 MMHG | SYSTOLIC BLOOD PRESSURE: 112 MMHG | HEART RATE: 91 BPM | WEIGHT: 142.8 LBS | RESPIRATION RATE: 10 BRPM | HEIGHT: 61 IN | BODY MASS INDEX: 26.96 KG/M2

## 2019-07-03 DIAGNOSIS — F41.9 ANXIETY: ICD-10-CM

## 2019-07-03 DIAGNOSIS — F33.1 MODERATE EPISODE OF RECURRENT MAJOR DEPRESSIVE DISORDER (HCC): Primary | ICD-10-CM

## 2019-07-03 PROCEDURE — 99203 OFFICE O/P NEW LOW 30 MIN: CPT | Performed by: NURSE PRACTITIONER

## 2019-07-03 PROCEDURE — G0444 DEPRESSION SCREEN ANNUAL: HCPCS | Performed by: NURSE PRACTITIONER

## 2019-07-03 RX ORDER — PAROXETINE 10 MG/1
10 TABLET, FILM COATED ORAL EVERY MORNING
Qty: 90 TABLET | Refills: 3 | Status: SHIPPED | OUTPATIENT
Start: 2019-07-03 | End: 2019-08-14 | Stop reason: SINTOL

## 2019-07-03 RX ORDER — ALBUTEROL SULFATE 90 UG/1
2 AEROSOL, METERED RESPIRATORY (INHALATION)
COMMUNITY

## 2019-07-03 RX ORDER — PHENAZOPYRIDINE HYDROCHLORIDE 100 MG/1
TABLET, FILM COATED ORAL
COMMUNITY
End: 2019-08-21 | Stop reason: ALTCHOICE

## 2019-07-03 RX ORDER — IBUPROFEN 800 MG/1
800 TABLET ORAL
COMMUNITY
Start: 2012-04-20 | End: 2019-07-30 | Stop reason: ALTCHOICE

## 2019-07-03 ASSESSMENT — PATIENT HEALTH QUESTIONNAIRE - PHQ9
SUM OF ALL RESPONSES TO PHQ9 QUESTIONS 1 & 2: 4
2. FEELING DOWN, DEPRESSED OR HOPELESS: 1
5. POOR APPETITE OR OVEREATING: 3
SUM OF ALL RESPONSES TO PHQ QUESTIONS 1-9: 18
1. LITTLE INTEREST OR PLEASURE IN DOING THINGS: 3
7. TROUBLE CONCENTRATING ON THINGS, SUCH AS READING THE NEWSPAPER OR WATCHING TELEVISION: 3
3. TROUBLE FALLING OR STAYING ASLEEP: 3
10. IF YOU CHECKED OFF ANY PROBLEMS, HOW DIFFICULT HAVE THESE PROBLEMS MADE IT FOR YOU TO DO YOUR WORK, TAKE CARE OF THINGS AT HOME, OR GET ALONG WITH OTHER PEOPLE: 1
SUM OF ALL RESPONSES TO PHQ QUESTIONS 1-9: 18
6. FEELING BAD ABOUT YOURSELF - OR THAT YOU ARE A FAILURE OR HAVE LET YOURSELF OR YOUR FAMILY DOWN: 3
9. THOUGHTS THAT YOU WOULD BE BETTER OFF DEAD, OR OF HURTING YOURSELF: 0
4. FEELING TIRED OR HAVING LITTLE ENERGY: 2
8. MOVING OR SPEAKING SO SLOWLY THAT OTHER PEOPLE COULD HAVE NOTICED. OR THE OPPOSITE, BEING SO FIGETY OR RESTLESS THAT YOU HAVE BEEN MOVING AROUND A LOT MORE THAN USUAL: 0

## 2019-07-03 NOTE — PROGRESS NOTES
SUBJECTIVE:  Treva Alcala is a 22 y.o. female for   Chief Complaint   Patient presents with    Depression     pt was started on Lexapro 2 years ago which she feels caused palpitations. She stopped the Lexapro, and never came back to the office because she lost her insurance. Pt has insurance now and would like to discuss a different medication for depression        HPI:    Depressed Mood? yes -   Anhedonia? yes -   Appetite changes?  no  Sleep disturbances? yes - , worries a lot can not fall asleep  Feelings of guilt? yes - over her divorce from her  because children are iinvolved  Decreased energy? yes -   Impaired concentration? yes - , she has taken adhd meds in the past  Substance abuse? no    Suicidal/Homicidal Ideation? no      Compliant with meds: Yes  Med side effects: yes - she has tired lexapro in the past and it gave her heart palpitations, she has also tried zoloft which made her too tired   Sees therapist?:  no  Family History of Mental Illness?   Yes her father has bipolar    Psychological ROS: negative for - behavioral disorder, hostility, irritability or suicidal ideation      OBJECTIVE:    /62   Pulse 91   Temp 98.5 °F (36.9 °C) (Oral)   Resp 10   Ht 5' 1\" (1.549 m)   Wt 142 lb 12.8 oz (64.8 kg)   LMP 06/22/2019   BMI 26.98 kg/m²   General Appearance: well developed and well- nourished, in no acute distress  Head: normocephalic and atraumatic  Eyes: pupils equal, round, and reactive to light, extraocular eye movements intact, conjunctivae normal  ENT: external ear and ear canal normal bilaterally, nose without deformity, nasal mucosa and turbinates normal without polyps  Neck: supple and non-tender without mass, no thyromegaly or thyroid nodules, no cervical lymphadenopathy  Pulmonary/Chest: clear to auscultation bilaterally- no wheezes, rales or rhonchi, normal air movement, no respiratory distress  Cardiovascular: normal rate, regular rhythm, normal S1 and S2, no

## 2019-07-03 NOTE — TELEPHONE ENCOUNTER
Pt scheduled as NP today 7/3/19 with Bran Wade at 2:00. [place] requested that I call pt to see if this time still worked for her. LMOM requesting pt to call back at earliest convenience.

## 2019-07-15 ENCOUNTER — TELEPHONE (OUTPATIENT)
Dept: FAMILY MEDICINE CLINIC | Age: 24
End: 2019-07-15

## 2019-07-15 NOTE — LETTER
[address]  [address] [place] [address]. [place] [address]  Phone: [phone]  Fax: [phone]    MARYCRUZ Lawler CNP        July 23, 2019    [address]      Dear Oral Charenton: We have made several attempts to contact you by phone and have   been unsuccessful. Please call our office at your earliest convenience  At [phone] opt [phone]. Thank you. If you have any questions or concerns, please don't hesitate to call.     Sincerely,        MARYCRUZ Lawler CNP

## 2019-07-30 ENCOUNTER — HOSPITAL ENCOUNTER (EMERGENCY)
Age: 24
Discharge: HOME | End: 2019-07-30
Attending: EMERGENCY MEDICINE
Payer: COMMERCIAL

## 2019-07-30 VITALS
HEART RATE: 74 BPM | OXYGEN SATURATION: 99 % | DIASTOLIC BLOOD PRESSURE: 63 MMHG | BODY MASS INDEX: 27.02 KG/M2 | RESPIRATION RATE: 16 BRPM | SYSTOLIC BLOOD PRESSURE: 115 MMHG | WEIGHT: 143 LBS | TEMPERATURE: 97.7 F

## 2019-07-30 DIAGNOSIS — J06.9 VIRAL UPPER RESPIRATORY TRACT INFECTION WITH COUGH: Primary | ICD-10-CM

## 2019-07-30 LAB
GROUP A STREP CULTURE, REFLEX: NEGATIVE
REFLEX THROAT C + S: NORMAL

## 2019-07-30 PROCEDURE — 87070 CULTURE OTHR SPECIMN AEROBIC: CPT

## 2019-07-30 PROCEDURE — 99213 OFFICE O/P EST LOW 20 MIN: CPT

## 2019-07-30 PROCEDURE — 87880 STREP A ASSAY W/OPTIC: CPT

## 2019-07-30 PROCEDURE — 99213 OFFICE O/P EST LOW 20 MIN: CPT | Performed by: EMERGENCY MEDICINE

## 2019-07-30 RX ORDER — PROMETHAZINE HYDROCHLORIDE AND CODEINE PHOSPHATE 6.25; 1 MG/5ML; MG/5ML
5 SYRUP ORAL 4 TIMES DAILY PRN
Qty: 120 ML | Refills: 0 | Status: SHIPPED | OUTPATIENT
Start: 2019-07-30 | End: 2019-08-03

## 2019-07-30 RX ORDER — CETIRIZINE HYDROCHLORIDE, PSEUDOEPHEDRINE HYDROCHLORIDE 5; 120 MG/1; MG/1
1 TABLET, FILM COATED, EXTENDED RELEASE ORAL 2 TIMES DAILY
Qty: 30 TABLET | Refills: 0 | Status: SHIPPED | OUTPATIENT
Start: 2019-07-30 | End: 2019-08-14

## 2019-07-30 RX ORDER — IBUPROFEN 600 MG/1
600 TABLET ORAL 3 TIMES DAILY PRN
Qty: 20 TABLET | Refills: 0 | Status: SHIPPED | OUTPATIENT
Start: 2019-07-30 | End: 2020-01-06

## 2019-07-30 ASSESSMENT — ENCOUNTER SYMPTOMS
SORE THROAT: 1
TROUBLE SWALLOWING: 0
COUGH: 1
DIARRHEA: 0
CONSTIPATION: 0
ABDOMINAL PAIN: 0
RHINORRHEA: 1
VOICE CHANGE: 0
EYE REDNESS: 0
BLOOD IN STOOL: 0
STRIDOR: 0
NAUSEA: 0
EYE DISCHARGE: 0
WHEEZING: 0
CHOKING: 0
VOMITING: 0
SINUS PRESSURE: 1
FACIAL SWELLING: 0
BACK PAIN: 0
EYE PAIN: 0
SHORTNESS OF BREATH: 0

## 2019-07-30 ASSESSMENT — PAIN DESCRIPTION - PAIN TYPE: TYPE: ACUTE PAIN

## 2019-07-30 ASSESSMENT — PAIN DESCRIPTION - LOCATION: LOCATION: THROAT

## 2019-07-30 ASSESSMENT — PAIN SCALES - GENERAL: PAINLEVEL_OUTOF10: 5

## 2019-07-30 NOTE — ED NOTES
Pt discharged. Discharge assessments completed. No changes. All discharge education and information given. Pt instructed to go to ED for any shortness of breath, chest pain or abd pain. Verbalized understanding. Left stable.      Vadim Reynaga LPN  87/80/37 [id number]

## 2019-07-30 NOTE — ED PROVIDER NOTES
and are negative. PAST MEDICAL HISTORY         Diagnosis Date    Anxiety     Asthma     Ovarian cyst     Tachycardia     UTI (urinary tract infection)     has urinary reflux       SURGICAL HISTORY     Patient  has a past surgical history that includes  section (2012 and 2016); [place] tooth extraction (); and toenail excision (Bilateral, ). CURRENT MEDICATIONS       Discharge Medication List as of 2019  8:38 AM      CONTINUE these medications which have NOT CHANGED    Details   PARAGARD INTRAUTERINE COPPER IU birth control implant   Quantity: 0 Refills: 0    ActiveHistorical Med      phenazopyridine (PYRIDIUM) 100 MG tablet Take by mouthHistorical Med      albuterol sulfate  (90 Base) MCG/ACT inhaler Inhale 2 puffs into the lungsHistorical Med      PARoxetine (PAXIL) 10 MG tablet Take 1 tablet by mouth every morning, Disp-90 tablet, R-3Normal      acetaminophen (APAP EXTRA STRENGTH) 500 MG tablet Take 1 tablet by mouth every 6 hours as needed for Pain, Disp-120 tablet, R-0Print      Multiple Vitamins-Minerals (THERAPEUTIC MULTIVITAMIN-MINERALS) tablet Take 1 tablet by mouth dailyHistorical Med      IUD'S IU by Intrauterine routeHistorical Med             ALLERGIES     Patient is is allergic to pollen extract. FAMILY HISTORY     Patient'sfamily history includes Other in her mother. SOCIAL HISTORY     Patient  reports that she quit smoking about 3 years ago. She has never used smokeless tobacco. She reports that she drinks alcohol. She reports that she does not use drugs. PHYSICAL EXAM     ED TRIAGE VITALS  BP: 115/63, Temp: 97.7 °F (36.5 °C), Pulse: 74, Resp: 16, SpO2: 99 %  Physical Exam   Constitutional: She is oriented to person, place, and time. She appears well-developed and well-nourished. No distress. nasal voice, moist membranes   HENT:   Head: Normocephalic and atraumatic.    Right Ear: Tympanic membrane and external ear normal.   Left Ear:

## 2019-07-30 NOTE — ED TRIAGE NOTES
Pt walked to room 7. Pt here with complaints of a sore throat, nasal congestion, sinus drainage and chest tightness. Started 4 days ago.

## 2019-08-01 LAB — THROAT/NOSE CULTURE: NORMAL

## 2019-08-14 ENCOUNTER — HOSPITAL ENCOUNTER (EMERGENCY)
Age: 24
Discharge: HOME | End: 2019-08-14
Payer: COMMERCIAL

## 2019-08-14 VITALS
HEART RATE: 85 BPM | WEIGHT: 141 LBS | BODY MASS INDEX: 25.95 KG/M2 | SYSTOLIC BLOOD PRESSURE: 119 MMHG | RESPIRATION RATE: 16 BRPM | DIASTOLIC BLOOD PRESSURE: 73 MMHG | HEIGHT: 62 IN | TEMPERATURE: 98.3 F | OXYGEN SATURATION: 98 %

## 2019-08-14 DIAGNOSIS — J01.00 ACUTE MAXILLARY SINUSITIS, RECURRENCE NOT SPECIFIED: Primary | ICD-10-CM

## 2019-08-14 DIAGNOSIS — N30.01 ACUTE CYSTITIS WITH HEMATURIA: ICD-10-CM

## 2019-08-14 LAB
BILIRUBIN URINE: ABNORMAL
BLOOD, URINE: NEGATIVE
CHARACTER, URINE: CLEAR
COLOR: ABNORMAL
GLUCOSE, URINE: 100 MG/DL
KETONES, URINE: 15
LEUKOCYTES, UA: ABNORMAL
NITRATE, UA: POSITIVE
PH UA: 5 (ref 5–9)
PREGNANCY, URINE: NEGATIVE
PROTEIN UA: 100 MG/DL
REFLEX TO URINE C & S: ABNORMAL
SPECIFIC GRAVITY UA: <= 1.005 (ref 1–1.03)
UROBILINOGEN, URINE: 4 EU/DL (ref 0–1)

## 2019-08-14 PROCEDURE — 84703 CHORIONIC GONADOTROPIN ASSAY: CPT

## 2019-08-14 PROCEDURE — 81003 URINALYSIS AUTO W/O SCOPE: CPT

## 2019-08-14 PROCEDURE — 87086 URINE CULTURE/COLONY COUNT: CPT

## 2019-08-14 PROCEDURE — 99214 OFFICE O/P EST MOD 30 MIN: CPT | Performed by: NURSE PRACTITIONER

## 2019-08-14 PROCEDURE — 87186 SC STD MICRODIL/AGAR DIL: CPT

## 2019-08-14 PROCEDURE — 99213 OFFICE O/P EST LOW 20 MIN: CPT

## 2019-08-14 PROCEDURE — 87077 CULTURE AEROBIC IDENTIFY: CPT

## 2019-08-14 RX ORDER — CRANBERRY FRUIT EXTRACT 200 MG
CAPSULE ORAL
COMMUNITY

## 2019-08-14 RX ORDER — CEFDINIR 300 MG/1
300 CAPSULE ORAL 2 TIMES DAILY
Qty: 14 CAPSULE | Refills: 0 | Status: SHIPPED | OUTPATIENT
Start: 2019-08-14 | End: 2019-08-21 | Stop reason: ALTCHOICE

## 2019-08-14 RX ORDER — PREDNISONE 20 MG/1
40 TABLET ORAL DAILY
Qty: 14 TABLET | Refills: 0 | Status: SHIPPED | OUTPATIENT
Start: 2019-08-14 | End: 2019-08-21 | Stop reason: ALTCHOICE

## 2019-08-14 ASSESSMENT — PAIN DESCRIPTION - PROGRESSION: CLINICAL_PROGRESSION: GRADUALLY WORSENING

## 2019-08-14 ASSESSMENT — ENCOUNTER SYMPTOMS
VOMITING: 0
BACK PAIN: 1
SINUS PRESSURE: 1
SORE THROAT: 0
COUGH: 1
SHORTNESS OF BREATH: 0
ABDOMINAL PAIN: 0
NAUSEA: 0

## 2019-08-14 ASSESSMENT — PAIN DESCRIPTION - ONSET: ONSET: PROGRESSIVE

## 2019-08-14 ASSESSMENT — PAIN DESCRIPTION - LOCATION: LOCATION: THROAT;NECK

## 2019-08-14 ASSESSMENT — PAIN DESCRIPTION - DESCRIPTORS: DESCRIPTORS: ACHING

## 2019-08-14 ASSESSMENT — PAIN SCALES - GENERAL: PAINLEVEL_OUTOF10: 5

## 2019-08-14 ASSESSMENT — PAIN - FUNCTIONAL ASSESSMENT: PAIN_FUNCTIONAL_ASSESSMENT: ACTIVITIES ARE NOT PREVENTED

## 2019-08-14 ASSESSMENT — PAIN DESCRIPTION - FREQUENCY: FREQUENCY: CONTINUOUS

## 2019-08-14 ASSESSMENT — PAIN DESCRIPTION - PAIN TYPE: TYPE: ACUTE PAIN

## 2019-08-14 NOTE — ED PROVIDER NOTES
[facility]  Urgent Care Encounter       CHIEF COMPLAINT       Chief Complaint   Patient presents with    Urinary Frequency     with urgency and burning    Back Pain     bilateral low    Lymphadenopathy     \"swollen nodes in my neck with sore throat and cough'       Nurses Notes reviewed and I agree except as noted in the HPI. HISTORY OF PRESENT ILLNESS   Radha Betts is a 22 y.o. female who presents for evaluation of multiple concerns. Patient states that for the past 4 days she has had cough, congestion, sinus pressure and feeling of swollen lymph nodes in her throat. She states that she did have a 102 fever 1 day, however has not had any since. States that she has been taking DayQuil and ibuprofen at home which did not provide much relief. She denies any significant nausea or vomiting. She also states that she has had dysuria for the past 3 days it seems to be worse over the past day. States that she has had frequency and intermittent back pain. She states that she did take Azo earlier today for her symptoms. The history is provided by the patient. REVIEW OF SYSTEMS     Review of Systems   Constitutional: Positive for fever. Negative for chills. HENT: Positive for congestion and sinus pressure. Negative for sore throat. Respiratory: Positive for cough. Negative for shortness of breath. Cardiovascular: Negative for chest pain. Gastrointestinal: Negative for abdominal pain, nausea and vomiting. Genitourinary: Positive for dysuria, frequency and urgency. Musculoskeletal: Positive for back pain. Negative for arthralgias and myalgias. Skin: Negative for rash. Allergic/Immunologic: Positive for environmental allergies. Neurological: Negative for headaches.        PAST MEDICAL HISTORY         Diagnosis Date    Anxiety     Asthma     Ovarian cyst     Tachycardia     UTI (urinary tract infection)     has urinary reflux       SURGICALHISTORY well-developed and well-nourished. No distress. HENT:   Right Ear: Tympanic membrane is bulging. Tympanic membrane is not erythematous. Left Ear: Tympanic membrane is bulging. Tympanic membrane is not erythematous. Nose: Right sinus exhibits maxillary sinus tenderness. Right sinus exhibits no frontal sinus tenderness. Left sinus exhibits maxillary sinus tenderness. Left sinus exhibits no frontal sinus tenderness. Mouth/Throat: Posterior oropharyngeal erythema present. No oropharyngeal exudate. Tonsils are 0 on the right. Tonsils are 0 on the left. Eyes: Right conjunctiva is not injected. Left conjunctiva is not injected. Pupils are equal.   Neck: Normal range of motion. Cardiovascular: Normal rate and regular rhythm. No murmur heard. Pulmonary/Chest: Effort normal and breath sounds normal. No respiratory distress. Abdominal: Soft. Normal appearance and bowel sounds are normal. There is no tenderness. There is no CVA tenderness. Musculoskeletal:        Right knee: She exhibits normal range of motion. Left knee: She exhibits normal range of motion. Lymphadenopathy:        Head (right side): No tonsillar adenopathy present. Head (left side): No tonsillar adenopathy present. She has cervical adenopathy. Right cervical: Superficial cervical adenopathy present. Left cervical: Superficial cervical adenopathy present. Neurological: She is alert and oriented to person, place, and time. Skin: Skin is warm. No rash noted. She is not diaphoretic. Psychiatric: She has a normal mood and affect.  Her behavior is normal.       DIAGNOSTIC RESULTS     Labs:  Results for orders placed or performed during the hospital encounter of 08/14/19   UA without Microscopic Reflex C&S   Result Value Ref Range    Glucose, Urine 100 (A) NEGATIVE mg/dl    Bilirubin Urine Small (A) NEGATIVE    Ketones, Urine 15 (A) NEGATIVE    Specific Gravity, UA <=1.005 1.002 - 1.03    Blood, Urine Negative

## 2019-08-16 LAB
ORGANISM: ABNORMAL
URINE CULTURE REFLEX: ABNORMAL

## 2019-08-21 ENCOUNTER — OFFICE VISIT (OUTPATIENT)
Dept: FAMILY MEDICINE CLINIC | Age: 24
End: 2019-08-21
Payer: COMMERCIAL

## 2019-08-21 ENCOUNTER — NURSE ONLY (OUTPATIENT)
Dept: LAB | Age: 24
End: 2019-08-21

## 2019-08-21 ENCOUNTER — TELEPHONE (OUTPATIENT)
Dept: FAMILY MEDICINE CLINIC | Age: 24
End: 2019-08-21

## 2019-08-21 VITALS
DIASTOLIC BLOOD PRESSURE: 68 MMHG | HEIGHT: 62 IN | WEIGHT: 144 LBS | SYSTOLIC BLOOD PRESSURE: 104 MMHG | BODY MASS INDEX: 26.5 KG/M2 | TEMPERATURE: 98.4 F | RESPIRATION RATE: 12 BRPM | HEART RATE: 88 BPM

## 2019-08-21 DIAGNOSIS — R52 BODY ACHES: ICD-10-CM

## 2019-08-21 DIAGNOSIS — N73.0 PID (ACUTE PELVIC INFLAMMATORY DISEASE): ICD-10-CM

## 2019-08-21 DIAGNOSIS — R50.9 FEVER, UNSPECIFIED FEVER CAUSE: ICD-10-CM

## 2019-08-21 DIAGNOSIS — R30.0 DYSURIA: ICD-10-CM

## 2019-08-21 DIAGNOSIS — R39.15 URINARY URGENCY: Primary | ICD-10-CM

## 2019-08-21 LAB
ANION GAP SERPL CALCULATED.3IONS-SCNC: 13 MEQ/L (ref 8–16)
BASOPHILS # BLD: 0.5 %
BASOPHILS ABSOLUTE: 0.1 THOU/MM3 (ref 0–0.1)
BILIRUBIN, POC: NORMAL
BLOOD URINE, POC: NORMAL
BUN BLDV-MCNC: 10 MG/DL (ref 7–22)
CALCIUM SERPL-MCNC: 9.2 MG/DL (ref 8.5–10.5)
CHLAMYDIA TRACHOMATIS BY RT-PCR: NOT DETECTED
CHLORIDE BLD-SCNC: 98 MEQ/L (ref 98–111)
CLARITY, POC: NORMAL
CO2: 25 MEQ/L (ref 23–33)
COLOR, POC: NORMAL
CREAT SERPL-MCNC: 0.6 MG/DL (ref 0.4–1.2)
CT/NG SOURCE: NORMAL
EOSINOPHIL # BLD: 2.4 %
EOSINOPHILS ABSOLUTE: 0.3 THOU/MM3 (ref 0–0.4)
ERYTHROCYTE [DISTWIDTH] IN BLOOD BY AUTOMATED COUNT: 13.5 % (ref 11.5–14.5)
ERYTHROCYTE [DISTWIDTH] IN BLOOD BY AUTOMATED COUNT: 46.4 FL (ref 35–45)
GFR SERPL CREATININE-BSD FRML MDRD: > 90 ML/MIN/1.73M2
GLUCOSE BLD-MCNC: 80 MG/DL (ref 70–108)
GLUCOSE URINE, POC: NORMAL
HCT VFR BLD CALC: 39.9 % (ref 37–47)
HEMOGLOBIN: 12.6 GM/DL (ref 12–16)
IMMATURE GRANS (ABS): 0.09 THOU/MM3 (ref 0–0.07)
IMMATURE GRANULOCYTES: 1 %
KETONES, POC: NORMAL
LEUKOCYTE EST, POC: NORMAL
LYMPHOCYTES # BLD: 33 %
LYMPHOCYTES ABSOLUTE: 4.1 THOU/MM3 (ref 1–4.8)
MCH RBC QN AUTO: 29.4 PG (ref 26–33)
MCHC RBC AUTO-ENTMCNC: 31.6 GM/DL (ref 32.2–35.5)
MCV RBC AUTO: 93 FL (ref 81–99)
MONOCYTES # BLD: 10.3 %
MONOCYTES ABSOLUTE: 1.3 THOU/MM3 (ref 0.4–1.3)
NEISSERIA GONORRHOEAE BY RT-PCR: NOT DETECTED
NITRITE, POC: NORMAL
NUCLEATED RED BLOOD CELLS: 0 /100 WBC
PH, POC: 6
PLATELET # BLD: 337 THOU/MM3 (ref 130–400)
PMV BLD AUTO: 10.6 FL (ref 9.4–12.4)
POTASSIUM SERPL-SCNC: 3.6 MEQ/L (ref 3.5–5.2)
PROTEIN, POC: NORMAL
RBC # BLD: 4.29 MILL/MM3 (ref 4.2–5.4)
SEG NEUTROPHILS: 53.1 %
SEGMENTED NEUTROPHILS ABSOLUTE COUNT: 6.5 THOU/MM3 (ref 1.8–7.7)
SODIUM BLD-SCNC: 136 MEQ/L (ref 135–145)
SPECIFIC GRAVITY, POC: 1.02
UROBILINOGEN, POC: 0.2
WBC # BLD: 12.3 THOU/MM3 (ref 4.8–10.8)

## 2019-08-21 PROCEDURE — 87808 TRICHOMONAS ASSAY W/OPTIC: CPT | Performed by: NURSE PRACTITIONER

## 2019-08-21 PROCEDURE — 96372 THER/PROPH/DIAG INJ SC/IM: CPT | Performed by: NURSE PRACTITIONER

## 2019-08-21 PROCEDURE — 99214 OFFICE O/P EST MOD 30 MIN: CPT | Performed by: NURSE PRACTITIONER

## 2019-08-21 PROCEDURE — 81002 URINALYSIS NONAUTO W/O SCOPE: CPT | Performed by: NURSE PRACTITIONER

## 2019-08-21 RX ORDER — CEFTRIAXONE SODIUM 250 MG/1
250 INJECTION, POWDER, FOR SOLUTION INTRAMUSCULAR; INTRAVENOUS ONCE
Status: COMPLETED | OUTPATIENT
Start: 2019-08-21 | End: 2019-08-21

## 2019-08-21 RX ORDER — DOXYCYCLINE HYCLATE 100 MG
100 TABLET ORAL 2 TIMES DAILY
Qty: 28 TABLET | Refills: 0 | Status: SHIPPED | OUTPATIENT
Start: 2019-08-21 | End: 2019-09-04

## 2019-08-21 RX ADMIN — CEFTRIAXONE SODIUM 250 MG: 250 INJECTION, POWDER, FOR SOLUTION INTRAMUSCULAR; INTRAVENOUS at 12:10

## 2019-08-21 NOTE — TELEPHONE ENCOUNTER
----- Message from MARYCRUZ Radford CNP sent at 8/21/2019  5:16 PM EDT -----  Please let pt know her gonorrhea and chlamydia testing is negative

## 2019-08-22 ENCOUNTER — TELEPHONE (OUTPATIENT)
Dept: FAMILY MEDICINE CLINIC | Age: 24
End: 2019-08-22

## 2019-08-22 LAB
ORGANISM: ABNORMAL
URINE CULTURE, ROUTINE: ABNORMAL

## 2019-08-22 NOTE — TELEPHONE ENCOUNTER
----- Message from MARYCRUZ Head CNP sent at 8/22/2019  7:45 AM EDT -----  Please let pt know her labs are normal, no elevated white count, renal function is normal.  Ask her if she has been feeling any better, dysuria gone, urinary hesitancy gone?  thanks

## 2019-09-09 ENCOUNTER — TELEPHONE (OUTPATIENT)
Dept: FAMILY MEDICINE CLINIC | Age: 24
End: 2019-09-09

## 2020-01-06 ENCOUNTER — HOSPITAL ENCOUNTER (EMERGENCY)
Age: 25
Discharge: HOME | End: 2020-01-06
Payer: COMMERCIAL

## 2020-01-06 VITALS
BODY MASS INDEX: 23.92 KG/M2 | SYSTOLIC BLOOD PRESSURE: 113 MMHG | DIASTOLIC BLOOD PRESSURE: 63 MMHG | HEIGHT: 62 IN | RESPIRATION RATE: 18 BRPM | TEMPERATURE: 98.9 F | HEART RATE: 96 BPM | WEIGHT: 130 LBS | OXYGEN SATURATION: 98 %

## 2020-01-06 PROCEDURE — 99212 OFFICE O/P EST SF 10 MIN: CPT

## 2020-01-06 PROCEDURE — 99213 OFFICE O/P EST LOW 20 MIN: CPT | Performed by: NURSE PRACTITIONER

## 2020-01-06 RX ORDER — AZELASTINE 1 MG/ML
1 SPRAY, METERED NASAL 2 TIMES DAILY
Qty: 1 BOTTLE | Refills: 0 | Status: SHIPPED | OUTPATIENT
Start: 2020-01-06

## 2020-01-06 RX ORDER — AMOXICILLIN AND CLAVULANATE POTASSIUM 875; 125 MG/1; MG/1
1 TABLET, FILM COATED ORAL 2 TIMES DAILY
Qty: 10 TABLET | Refills: 0 | Status: SHIPPED | OUTPATIENT
Start: 2020-01-06 | End: 2020-01-11

## 2020-01-06 RX ORDER — IBUPROFEN 400 MG/1
400 TABLET ORAL EVERY 6 HOURS PRN
Qty: 30 TABLET | Refills: 0 | Status: SHIPPED | OUTPATIENT
Start: 2020-01-06

## 2020-01-06 RX ORDER — BROMPHENIRAMINE MALEATE, PSEUDOEPHEDRINE HYDROCHLORIDE, AND DEXTROMETHORPHAN HYDROBROMIDE 2; 30; 10 MG/5ML; MG/5ML; MG/5ML
5 SYRUP ORAL 4 TIMES DAILY PRN
Qty: 60 ML | Refills: 0 | Status: SHIPPED | OUTPATIENT
Start: 2020-01-06

## 2020-01-06 ASSESSMENT — ENCOUNTER SYMPTOMS
APNEA: 0
WHEEZING: 0
CHEST TIGHTNESS: 0
SORE THROAT: 1
RHINORRHEA: 1
SWOLLEN GLANDS: 0
SINUS PAIN: 0
NAUSEA: 0
SINUS PRESSURE: 1
SHORTNESS OF BREATH: 0
CHOKING: 0
DIARRHEA: 0
ABDOMINAL PAIN: 0
COUGH: 1
STRIDOR: 0

## 2020-01-06 ASSESSMENT — PAIN SCALES - GENERAL: PAINLEVEL_OUTOF10: 7

## 2020-01-06 NOTE — ED PROVIDER NOTES
[place] [address]  Urgent Care Encounter      CHIEF COMPLAINT       Chief Complaint   Patient presents with    Sinusitis     with drainage/cough, sore throat from coughing, body aches, chest congestion       Nurses Notes reviewed and I agree except as noted in the HPI. HISTORY OFPRESENT ILLNESS   Luis Strauss is a 22 y.o. The history is provided by the patient. No  was used. URI   Presenting symptoms: congestion, cough, fatigue, rhinorrhea and sore throat    Presenting symptoms: no ear pain, no facial pain and no fever    Severity:  Moderate  Onset quality:  Gradual  Duration:  1 week  Timing:  Intermittent  Progression:  Waxing and waning  Chronicity:  Recurrent  Relieved by:  Nothing  Worsened by:  Certain positions  Ineffective treatments:  OTC medications  Associated symptoms: headaches, myalgias and sneezing    Associated symptoms: no arthralgias, no neck pain, no sinus pain, no swollen glands and no wheezing    Risk factors: sick contacts    Risk factors: not elderly, no chronic cardiac disease, no chronic kidney disease, no chronic respiratory disease, no diabetes mellitus, no immunosuppression, no recent illness and no recent travel        REVIEW OF SYSTEMS     Review of Systems   Constitutional: Positive for chills and fatigue. Negative for activity change, appetite change, diaphoresis, fever and unexpected weight change. HENT: Positive for congestion, rhinorrhea, sinus pressure, sneezing and sore throat. Negative for ear pain and sinus pain. Respiratory: Positive for cough. Negative for apnea, choking, chest tightness, shortness of breath, wheezing and stridor. Cardiovascular: Negative for chest pain, palpitations and leg swelling. Gastrointestinal: Negative for abdominal pain, diarrhea and nausea. Musculoskeletal: Positive for myalgias. Negative for arthralgias and neck pain. Neurological: Positive for headaches.  Negative for dizziness and light-headedness. PAST MEDICAL HISTORY         Diagnosis Date    Anxiety     Asthma     Ovarian cyst     Tachycardia     UTI (urinary tract infection)     has urinary reflux       SURGICAL HISTORY     Patient  has a past surgical history that includes  section (2012 and 2016); [place] tooth extraction (); and toenail excision (Bilateral, ). CURRENT MEDICATIONS       Previous Medications    ACETAMINOPHEN (APAP EXTRA STRENGTH) 500 MG TABLET    Take 1 tablet by mouth every 6 hours as needed for Pain    ALBUTEROL SULFATE  (90 BASE) MCG/ACT INHALER    Inhale 2 puffs into the lungs    CRANBERRY 200 MG CAPS    Take by mouth    MULTIPLE VITAMINS-MINERALS (THERAPEUTIC MULTIVITAMIN-MINERALS) TABLET    Take 1 tablet by mouth daily    PARAGARD INTRAUTERINE COPPER IU    birth control implant   Quantity: 0 Refills: 0    Active       ALLERGIES     Patient is is allergic to pollen extract. FAMILY HISTORY     Patient's family history includes Other in her mother. SOCIAL HISTORY     Patient  reports that she quit smoking about 4 years ago. She has never used smokeless tobacco. She reports current alcohol use. She reports that she does not use drugs. PHYSICAL EXAM     ED TRIAGE VITALS  BP: 113/63, Temp: 98.9 °F (37.2 °C), Pulse: 96, Resp: 18, SpO2: 98 %  Physical Exam  Vitals signs and nursing note reviewed. Constitutional:       General: She is awake. She is not in acute distress. Appearance: Normal appearance. She is not ill-appearing, toxic-appearing or diaphoretic. Interventions: She is not intubated. HENT:      Head: Normocephalic and atraumatic. Right Ear: Hearing, ear canal and external ear normal. Tympanic membrane is bulging. Left Ear: Hearing, ear canal and external ear normal. Tympanic membrane is bulging. Nose:      Right Nostril: Occlusion present. Left Nostril: Occlusion present.       Right Sinus: Maxillary sinus tenderness and headache  Humidification of air  Use nasal spray as directed  Take a nasal decongestant as directed  Monitor for any fever increased and sinus pain or pressure  Follow-up see her primary care provider in 48 hours  Or go to the emergency department for any changes or concerns.       PATIENT REFERRED TO:  MARYCRUZ Chen CNP  [address]  [place] [address]  [phone]    Schedule an appointment as soon as possible for a visit in 1 week      DISCHARGE MEDICATIONS:  New Prescriptions    AMOXICILLIN-CLAVULANATE (AUGMENTIN) 875-125 MG PER TABLET    Take 1 tablet by mouth 2 times daily for 5 days    AZELASTINE (ASTELIN) 0.1 % NASAL SPRAY    1 spray by Nasal route 2 times daily Use in each nostril as directed    BROMPHENIRAMINE-PSEUDOEPHEDRINE-DM (BROMFED DM) 2-30-10 MG/5ML SYRUP    Take 5 mLs by mouth 4 times daily as needed for Congestion    IBUPROFEN (IBU) 400 MG TABLET    Take 1 tablet by mouth every 6 hours as needed for Pain     Current Discharge Medication List      CONTINUE these medications which have CHANGED    Details   ibuprofen (IBU) 400 MG tablet Take 1 tablet by mouth every 6 hours as needed for Pain  Qty: 30 tablet, Refills: 0             MARYCRUZ Calvillo CNP, APRN - CNP  01/06/20 0545

## 2020-01-06 NOTE — ED NOTES
Pt discharged. Discharge assessments completed. No changes. All discharge education and information given. Pt instructed to go to ED for any shortness of breath, chest pain or abd pain. Verbalized understanding. Left stable.      Troy Perez LPN  61/33/84 1117

## 2020-02-20 ENCOUNTER — HOSPITAL ENCOUNTER (EMERGENCY)
Age: 25
Discharge: HOME | End: 2020-02-20
Attending: FAMILY MEDICINE

## 2020-02-20 VITALS
TEMPERATURE: 99.1 F | SYSTOLIC BLOOD PRESSURE: 105 MMHG | OXYGEN SATURATION: 100 % | DIASTOLIC BLOOD PRESSURE: 65 MMHG | WEIGHT: 138 LBS | BODY MASS INDEX: 25.4 KG/M2 | RESPIRATION RATE: 18 BRPM | HEIGHT: 62 IN | HEART RATE: 93 BPM

## 2020-02-20 LAB
ANION GAP SERPL CALCULATED.3IONS-SCNC: 13 MEQ/L (ref 8–16)
BASOPHILS # BLD: 0.2 %
BASOPHILS ABSOLUTE: 0 THOU/MM3 (ref 0–0.1)
BILIRUBIN URINE: NEGATIVE
BLOOD, URINE: NEGATIVE
BUN BLDV-MCNC: 11 MG/DL (ref 7–22)
CALCIUM SERPL-MCNC: 9.1 MG/DL (ref 8.5–10.5)
CHARACTER, URINE: CLEAR
CHLORIDE BLD-SCNC: 102 MEQ/L (ref 98–111)
CO2: 23 MEQ/L (ref 23–33)
COLOR: YELLOW
CREAT SERPL-MCNC: 0.4 MG/DL (ref 0.4–1.2)
EOSINOPHIL # BLD: 1.9 %
EOSINOPHILS ABSOLUTE: 0.2 THOU/MM3 (ref 0–0.4)
ERYTHROCYTE [DISTWIDTH] IN BLOOD BY AUTOMATED COUNT: 14.6 % (ref 11.5–14.5)
ERYTHROCYTE [DISTWIDTH] IN BLOOD BY AUTOMATED COUNT: 47.5 FL (ref 35–45)
FLU A ANTIGEN: NEGATIVE
FLU B ANTIGEN: NEGATIVE
GFR SERPL CREATININE-BSD FRML MDRD: > 90 ML/MIN/1.73M2
GLUCOSE BLD-MCNC: 92 MG/DL (ref 70–108)
GLUCOSE URINE: NEGATIVE MG/DL
HCT VFR BLD CALC: 40.4 % (ref 37–47)
HEMOGLOBIN: 13.1 GM/DL (ref 12–16)
IMMATURE GRANS (ABS): 0.02 THOU/MM3 (ref 0–0.07)
IMMATURE GRANULOCYTES: 0.2 %
KETONES, URINE: 15
LEUKOCYTE ESTERASE, URINE: NEGATIVE
LYMPHOCYTES # BLD: 8.1 %
LYMPHOCYTES ABSOLUTE: 0.7 THOU/MM3 (ref 1–4.8)
MCH RBC QN AUTO: 29 PG (ref 26–33)
MCHC RBC AUTO-ENTMCNC: 32.4 GM/DL (ref 32.2–35.5)
MCV RBC AUTO: 89.6 FL (ref 81–99)
MONOCYTES # BLD: 6.4 %
MONOCYTES ABSOLUTE: 0.5 THOU/MM3 (ref 0.4–1.3)
NITRITE, URINE: NEGATIVE
NUCLEATED RED BLOOD CELLS: 0 /100 WBC
OSMOLALITY CALCULATION: 274.7 MOSMOL/KG (ref 275–300)
PH UA: 5.5 (ref 5–9)
PLATELET # BLD: 243 THOU/MM3 (ref 130–400)
PMV BLD AUTO: 10.3 FL (ref 9.4–12.4)
POTASSIUM SERPL-SCNC: 3.6 MEQ/L (ref 3.5–5.2)
PREGNANCY, SERUM: NEGATIVE
PROTEIN UA: NEGATIVE
RBC # BLD: 4.51 MILL/MM3 (ref 4.2–5.4)
SEG NEUTROPHILS: 83.2 %
SEGMENTED NEUTROPHILS ABSOLUTE COUNT: 6.9 THOU/MM3 (ref 1.8–7.7)
SODIUM BLD-SCNC: 138 MEQ/L (ref 135–145)
SPECIFIC GRAVITY, URINE: 1.03 (ref 1–1.03)
UROBILINOGEN, URINE: 0.2 EU/DL (ref 0–1)
WBC # BLD: 8.3 THOU/MM3 (ref 4.8–10.8)

## 2020-02-20 PROCEDURE — 2500000003 HC RX 250 WO HCPCS: Performed by: FAMILY MEDICINE

## 2020-02-20 PROCEDURE — 6370000000 HC RX 637 (ALT 250 FOR IP): Performed by: FAMILY MEDICINE

## 2020-02-20 PROCEDURE — 6360000002 HC RX W HCPCS: Performed by: FAMILY MEDICINE

## 2020-02-20 PROCEDURE — 99282 EMERGENCY DEPT VISIT SF MDM: CPT

## 2020-02-20 PROCEDURE — 96375 TX/PRO/DX INJ NEW DRUG ADDON: CPT

## 2020-02-20 PROCEDURE — 80048 BASIC METABOLIC PNL TOTAL CA: CPT

## 2020-02-20 PROCEDURE — 36415 COLL VENOUS BLD VENIPUNCTURE: CPT

## 2020-02-20 PROCEDURE — 81003 URINALYSIS AUTO W/O SCOPE: CPT

## 2020-02-20 PROCEDURE — 96365 THER/PROPH/DIAG IV INF INIT: CPT

## 2020-02-20 PROCEDURE — 2580000003 HC RX 258: Performed by: FAMILY MEDICINE

## 2020-02-20 PROCEDURE — 87804 INFLUENZA ASSAY W/OPTIC: CPT

## 2020-02-20 PROCEDURE — 85025 COMPLETE CBC W/AUTO DIFF WBC: CPT

## 2020-02-20 PROCEDURE — 84703 CHORIONIC GONADOTROPIN ASSAY: CPT

## 2020-02-20 RX ORDER — ONDANSETRON 4 MG/1
4 TABLET, FILM COATED ORAL EVERY 8 HOURS PRN
Qty: 6 TABLET | Refills: 0 | Status: SHIPPED | OUTPATIENT
Start: 2020-02-20

## 2020-02-20 RX ORDER — 0.9 % SODIUM CHLORIDE 0.9 %
1000 INTRAVENOUS SOLUTION INTRAVENOUS ONCE
Status: COMPLETED | OUTPATIENT
Start: 2020-02-20 | End: 2020-02-20

## 2020-02-20 RX ORDER — KETOROLAC TROMETHAMINE 30 MG/ML
30 INJECTION, SOLUTION INTRAMUSCULAR; INTRAVENOUS ONCE
Status: COMPLETED | OUTPATIENT
Start: 2020-02-20 | End: 2020-02-20

## 2020-02-20 RX ORDER — AZITHROMYCIN 250 MG/1
1000 TABLET, FILM COATED ORAL ONCE
Status: COMPLETED | OUTPATIENT
Start: 2020-02-20 | End: 2020-02-20

## 2020-02-20 RX ORDER — ONDANSETRON 2 MG/ML
4 INJECTION INTRAMUSCULAR; INTRAVENOUS ONCE
Status: COMPLETED | OUTPATIENT
Start: 2020-02-20 | End: 2020-02-20

## 2020-02-20 RX ADMIN — SODIUM CHLORIDE 1000 ML: 9 INJECTION, SOLUTION INTRAVENOUS at 19:11

## 2020-02-20 RX ADMIN — AZITHROMYCIN MONOHYDRATE 1000 MG: 250 TABLET ORAL at 19:52

## 2020-02-20 RX ADMIN — CEFTRIAXONE SODIUM 1 G: 1 INJECTION, POWDER, FOR SOLUTION INTRAMUSCULAR; INTRAVENOUS at 19:52

## 2020-02-20 RX ADMIN — FAMOTIDINE 20 MG: 10 INJECTION, SOLUTION INTRAVENOUS at 19:18

## 2020-02-20 RX ADMIN — KETOROLAC TROMETHAMINE 30 MG: 30 INJECTION, SOLUTION INTRAMUSCULAR at 19:19

## 2020-02-20 RX ADMIN — ONDANSETRON 4 MG: 2 INJECTION INTRAMUSCULAR; INTRAVENOUS at 19:19

## 2020-02-20 ASSESSMENT — PAIN SCALES - GENERAL
PAINLEVEL_OUTOF10: 7
PAINLEVEL_OUTOF10: 7
PAINLEVEL_OUTOF10: 4

## 2020-02-20 ASSESSMENT — PAIN DESCRIPTION - PAIN TYPE
TYPE: ACUTE PAIN
TYPE: ACUTE PAIN

## 2020-02-20 ASSESSMENT — PAIN DESCRIPTION - DESCRIPTORS: DESCRIPTORS: ACHING

## 2020-02-20 ASSESSMENT — PAIN DESCRIPTION - LOCATION: LOCATION: BACK;GENERALIZED

## 2020-02-20 ASSESSMENT — ENCOUNTER SYMPTOMS
DIARRHEA: 0
NAUSEA: 1
VOMITING: 1
COUGH: 0

## 2020-02-20 ASSESSMENT — PAIN DESCRIPTION - FREQUENCY: FREQUENCY: CONTINUOUS

## 2020-02-20 NOTE — ED NOTES
Presents to ED for dysuria, emesis, generalized body aches, and headache that began today. Rates pain 7/10. Shows no signs of distress. Vital signs as noted.       Bola Zuniga RN  02/20/20 3791

## 2020-02-20 NOTE — ED NOTES
Flu swab obtained and sent to lab.       Carmen [facility]letteGe[facility]  02/20/20 9395

## 2020-02-21 NOTE — ED PROVIDER NOTES
[facility]  eMERGENCY dEPARTMENT eNCOUnter          [address] [facility]       Chief Complaint   Patient presents with    Dysuria    Emesis    Generalized Body Aches    Headache       Nurses Notes reviewed and I agree except as noted in the HPI. HISTORY OF PRESENT ILLNESS    Diego Asif is a 22 y.o. female who presents to the Emergency Department for the evaluation of flank pain and emesis that started this morning around 8 AM. The patient states that she has had multiple episodes of emesis and has an episode with any oral intake. She also admits to dysuria, fever, and chills. She has a history of frequent UTIs. She states that her UTI's have been resistant to common antibiotics such as bactrim in the past. She was last treated with antibiotics approximately 4 months ago. Patient denies URI symptoms, diarrhea, and abdominal pain/cramping. Patient's LMP started 5 days ago. She has a copper IUD. No further concerns or complaints at this time. The HPI was provided by the patient. REVIEW OF SYSTEMS     Review of Systems   Constitutional: Negative for chills and fever. HENT: Negative for congestion. Respiratory: Negative for cough. Cardiovascular: Negative for chest pain. Gastrointestinal: Positive for nausea and vomiting. Negative for diarrhea. Genitourinary: Positive for dysuria, frequency and urgency. LMP 2020    5 days of flow     she does have an Intrauterine device   Musculoskeletal: Positive for myalgias. Neurological: Negative for headaches. Hematological: Negative for adenopathy. Psychiatric/Behavioral: Negative for confusion. PAST MEDICAL HISTORY    has a past medical history of Anxiety, Asthma, Ovarian cyst, Tachycardia, and UTI (urinary tract infection). SURGICAL HISTORY    has a past surgical history that includes  section (2012 and 2016);  [place] tooth extraction (); and toenail excision (Bilateral, 2012). CURRENT MEDICATIONS       Previous Medications    ACETAMINOPHEN (APAP EXTRA STRENGTH) 500 MG TABLET    Take 1 tablet by mouth every 6 hours as needed for Pain    ALBUTEROL SULFATE  (90 BASE) MCG/ACT INHALER    Inhale 2 puffs into the lungs    AZELASTINE (ASTELIN) 0.1 % NASAL SPRAY    1 spray by Nasal route 2 times daily Use in each nostril as directed    BROMPHENIRAMINE-PSEUDOEPHEDRINE-DM (BROMFED DM) 2-30-10 MG/5ML SYRUP    Take 5 mLs by mouth 4 times daily as needed for Congestion    CRANBERRY 200 MG CAPS    Take by mouth    IBUPROFEN (IBU) 400 MG TABLET    Take 1 tablet by mouth every 6 hours as needed for Pain    MULTIPLE VITAMINS-MINERALS (THERAPEUTIC MULTIVITAMIN-MINERALS) TABLET    Take 1 tablet by mouth daily    PARAGARD INTRAUTERINE COPPER IU    birth control implant   Quantity: 0 Refills: 0    Active       ALLERGIES     is allergic to pollen extract. FAMILY HISTORY     She indicated that her mother is alive. She indicated that her father is alive. family history includes Other in her mother. SOCIAL HISTORY      reports that she quit smoking about 4 years ago. She has never used smokeless tobacco. She reports current alcohol use. She reports that she does not use drugs. PHYSICAL EXAM     INITIAL VITALS:  height is 5' 2\" (1.575 m) and weight is 138 lb (62.6 kg). Her oral temperature is 99.1 °F (37.3 °C). Her blood pressure is 105/65 and her pulse is 93. Her respiration is 18 and oxygen saturation is 100%. Physical Exam  Vitals signs and nursing note reviewed. Constitutional:       Comments: GCS 15   HENT:      Head: Normocephalic and atraumatic. Eyes:      Extraocular Movements: Extraocular movements intact. Pupils: Pupils are equal, round, and reactive to light. Neck:      Musculoskeletal: Normal range of motion and neck supple. No neck rigidity. Cardiovascular:      Rate and Rhythm: Normal rate and regular rhythm.    Pulmonary:      Effort: Pulmonary effort is

## 2020-02-21 NOTE — ED NOTES
Pt medicated per order. Pt VSS. Call light in reach . Lights dimmed for comfort.       Adelia Baez RN  02/20/20 1924

## 2021-10-15 ENCOUNTER — HOSPITAL ENCOUNTER (OUTPATIENT)
Age: 26
Discharge: HOME | End: 2021-10-15